# Patient Record
Sex: FEMALE | Race: WHITE | NOT HISPANIC OR LATINO | Employment: OTHER | URBAN - METROPOLITAN AREA
[De-identification: names, ages, dates, MRNs, and addresses within clinical notes are randomized per-mention and may not be internally consistent; named-entity substitution may affect disease eponyms.]

---

## 2017-01-06 ENCOUNTER — HOSPITAL ENCOUNTER (OUTPATIENT)
Dept: NUCLEAR MEDICINE | Facility: CLINIC | Age: 72
Setting detail: NUCLEAR MEDICINE
End: 2017-01-06
Attending: FAMILY MEDICINE
Payer: COMMERCIAL

## 2017-01-06 ENCOUNTER — HOSPITAL ENCOUNTER (OUTPATIENT)
Dept: CARDIOLOGY | Facility: CLINIC | Age: 72
Discharge: HOME | End: 2017-01-06
Attending: FAMILY MEDICINE | Admitting: FAMILY MEDICINE
Payer: COMMERCIAL

## 2017-01-06 DIAGNOSIS — R55 SYNCOPE, UNSPECIFIED SYNCOPE TYPE: Primary | ICD-10-CM

## 2017-01-06 DIAGNOSIS — R06.09 EXERTIONAL DYSPNEA: ICD-10-CM

## 2017-01-06 DIAGNOSIS — R55 SYNCOPE, UNSPECIFIED SYNCOPE TYPE: ICD-10-CM

## 2017-01-06 PROCEDURE — 25000125 ZZHC RX 250

## 2017-01-06 PROCEDURE — 93017 CV STRESS TEST TRACING ONLY: CPT

## 2017-01-06 PROCEDURE — 78452 HT MUSCLE IMAGE SPECT MULT: CPT | Mod: 26 | Performed by: INTERNAL MEDICINE

## 2017-01-06 PROCEDURE — 34300033 ZZH RX 343: Performed by: FAMILY MEDICINE

## 2017-01-06 PROCEDURE — 78452 HT MUSCLE IMAGE SPECT MULT: CPT

## 2017-01-06 PROCEDURE — 93016 CV STRESS TEST SUPVJ ONLY: CPT | Performed by: INTERNAL MEDICINE

## 2017-01-06 PROCEDURE — A9502 TC99M TETROFOSMIN: HCPCS | Performed by: FAMILY MEDICINE

## 2017-01-06 PROCEDURE — 93018 CV STRESS TEST I&R ONLY: CPT | Performed by: INTERNAL MEDICINE

## 2017-01-06 RX ORDER — REGADENOSON 0.08 MG/ML
INJECTION, SOLUTION INTRAVENOUS
Status: COMPLETED
Start: 2017-01-06 | End: 2017-01-06

## 2017-01-06 RX ADMIN — TETROFOSMIN 11 MCI.: 0.23 INJECTION, POWDER, LYOPHILIZED, FOR SOLUTION INTRAVENOUS at 08:11

## 2017-01-06 RX ADMIN — REGADENOSON 0.8 MG: 0.08 INJECTION, SOLUTION INTRAVENOUS at 09:36

## 2017-01-06 RX ADMIN — TETROFOSMIN 30.6 MCI.: 0.23 INJECTION, POWDER, LYOPHILIZED, FOR SOLUTION INTRAVENOUS at 09:21

## 2017-01-06 NOTE — PLAN OF CARE
Pt here for lexiscan, lungs are clear bilaterally, procedure explained and performed without difficulty, VSS

## 2020-01-11 ENCOUNTER — APPOINTMENT (OUTPATIENT)
Dept: CT IMAGING | Facility: CLINIC | Age: 75
DRG: 190 | End: 2020-01-11
Attending: INTERNAL MEDICINE
Payer: COMMERCIAL

## 2020-01-11 ENCOUNTER — TRANSFERRED RECORDS (OUTPATIENT)
Dept: HEALTH INFORMATION MANAGEMENT | Facility: CLINIC | Age: 75
End: 2020-01-11

## 2020-01-11 ENCOUNTER — HOSPITAL ENCOUNTER (INPATIENT)
Facility: CLINIC | Age: 75
LOS: 4 days | Discharge: HOME HEALTH | DRG: 190 | End: 2020-01-15
Attending: EMERGENCY MEDICINE | Admitting: INTERNAL MEDICINE
Payer: COMMERCIAL

## 2020-01-11 ENCOUNTER — APPOINTMENT (OUTPATIENT)
Dept: GENERAL RADIOLOGY | Facility: CLINIC | Age: 75
DRG: 190 | End: 2020-01-11
Attending: EMERGENCY MEDICINE
Payer: COMMERCIAL

## 2020-01-11 DIAGNOSIS — R05.9 COUGH: ICD-10-CM

## 2020-01-11 DIAGNOSIS — R91.8 PULMONARY NODULES: ICD-10-CM

## 2020-01-11 DIAGNOSIS — J96.01 ACUTE RESPIRATORY FAILURE WITH HYPOXIA (H): ICD-10-CM

## 2020-01-11 DIAGNOSIS — R06.2 WHEEZING: ICD-10-CM

## 2020-01-11 DIAGNOSIS — J44.1 COPD EXACERBATION (H): Primary | ICD-10-CM

## 2020-01-11 DIAGNOSIS — Z72.0 TOBACCO ABUSE DISORDER: ICD-10-CM

## 2020-01-11 DIAGNOSIS — J44.1 COPD WITH ACUTE EXACERBATION (H): ICD-10-CM

## 2020-01-11 DIAGNOSIS — I10 ESSENTIAL HYPERTENSION: ICD-10-CM

## 2020-01-11 DIAGNOSIS — R06.02 SHORTNESS OF BREATH: ICD-10-CM

## 2020-01-11 LAB
ALBUMIN SERPL-MCNC: 3.1 G/DL (ref 3.4–5)
ALP SERPL-CCNC: 87 U/L (ref 40–150)
ALT SERPL W P-5'-P-CCNC: 27 U/L (ref 0–50)
ANION GAP SERPL CALCULATED.3IONS-SCNC: 4 MMOL/L (ref 3–14)
AST SERPL W P-5'-P-CCNC: 31 U/L (ref 0–45)
BASOPHILS # BLD AUTO: 0 10E9/L (ref 0–0.2)
BASOPHILS NFR BLD AUTO: 0.5 %
BILIRUB SERPL-MCNC: 0.4 MG/DL (ref 0.2–1.3)
BUN SERPL-MCNC: 14 MG/DL (ref 7–30)
CALCIUM SERPL-MCNC: 8.7 MG/DL (ref 8.5–10.1)
CHLORIDE SERPL-SCNC: 98 MMOL/L (ref 94–109)
CO2 BLDCOV-SCNC: 26 MMOL/L (ref 21–28)
CO2 BLDCOV-SCNC: 27 MMOL/L (ref 21–28)
CO2 SERPL-SCNC: 29 MMOL/L (ref 20–32)
CREAT SERPL-MCNC: 0.8 MG/DL (ref 0.52–1.04)
DIFFERENTIAL METHOD BLD: ABNORMAL
EOSINOPHIL # BLD AUTO: 0 10E9/L (ref 0–0.7)
EOSINOPHIL NFR BLD AUTO: 0.1 %
ERYTHROCYTE [DISTWIDTH] IN BLOOD BY AUTOMATED COUNT: 16.1 % (ref 10–15)
FLUAV+FLUBV AG SPEC QL: NEGATIVE
FLUAV+FLUBV AG SPEC QL: NEGATIVE
GFR SERPL CREATININE-BSD FRML MDRD: 72 ML/MIN/{1.73_M2}
GLUCOSE SERPL-MCNC: 115 MG/DL (ref 70–99)
HCT VFR BLD AUTO: 40.7 % (ref 35–47)
HGB BLD-MCNC: 12.9 G/DL (ref 11.7–15.7)
IMM GRANULOCYTES # BLD: 0 10E9/L (ref 0–0.4)
IMM GRANULOCYTES NFR BLD: 0.4 %
INTERPRETATION ECG - MUSE: NORMAL
LACTATE BLD-SCNC: 1 MMOL/L (ref 0.7–2.1)
LACTATE BLD-SCNC: 2.1 MMOL/L (ref 0.7–2.1)
LYMPHOCYTES # BLD AUTO: 0.4 10E9/L (ref 0.8–5.3)
LYMPHOCYTES NFR BLD AUTO: 5.8 %
MAGNESIUM SERPL-MCNC: 2.2 MG/DL (ref 1.6–2.3)
MAGNESIUM SERPL-MCNC: 2.2 MG/DL (ref 1.6–2.3)
MCH RBC QN AUTO: 31.8 PG (ref 26.5–33)
MCHC RBC AUTO-ENTMCNC: 31.7 G/DL (ref 31.5–36.5)
MCV RBC AUTO: 100 FL (ref 78–100)
MONOCYTES # BLD AUTO: 0.6 10E9/L (ref 0–1.3)
MONOCYTES NFR BLD AUTO: 8.3 %
NEUTROPHILS # BLD AUTO: 6.4 10E9/L (ref 1.6–8.3)
NEUTROPHILS NFR BLD AUTO: 84.9 %
NRBC # BLD AUTO: 0 10*3/UL
NRBC BLD AUTO-RTO: 0 /100
PCO2 BLDV: 61 MM HG (ref 40–50)
PCO2 BLDV: 64 MM HG (ref 40–50)
PH BLDV: 7.22 PH (ref 7.32–7.43)
PH BLDV: 7.25 PH (ref 7.32–7.43)
PHOSPHATE SERPL-MCNC: 2.2 MG/DL (ref 2.5–4.5)
PHOSPHATE SERPL-MCNC: 2.5 MG/DL (ref 2.5–4.5)
PLATELET # BLD AUTO: 220 10E9/L (ref 150–450)
PO2 BLDV: 17 MM HG (ref 25–47)
PO2 BLDV: 23 MM HG (ref 25–47)
POTASSIUM SERPL-SCNC: 4.3 MMOL/L (ref 3.4–5.3)
PROT SERPL-MCNC: 8 G/DL (ref 6.8–8.8)
RBC # BLD AUTO: 4.06 10E12/L (ref 3.8–5.2)
RSV AG SPEC QL: POSITIVE
SAO2 % BLDV FROM PO2: 18 %
SAO2 % BLDV FROM PO2: 28 %
SODIUM SERPL-SCNC: 131 MMOL/L (ref 133–144)
SPECIMEN SOURCE: ABNORMAL
SPECIMEN SOURCE: NORMAL
TROPONIN I SERPL-MCNC: <0.015 UG/L (ref 0–0.04)
WBC # BLD AUTO: 7.6 10E9/L (ref 4–11)

## 2020-01-11 PROCEDURE — 83605 ASSAY OF LACTIC ACID: CPT

## 2020-01-11 PROCEDURE — 36415 COLL VENOUS BLD VENIPUNCTURE: CPT | Performed by: EMERGENCY MEDICINE

## 2020-01-11 PROCEDURE — 25000125 ZZHC RX 250: Performed by: EMERGENCY MEDICINE

## 2020-01-11 PROCEDURE — 80053 COMPREHEN METABOLIC PANEL: CPT | Performed by: EMERGENCY MEDICINE

## 2020-01-11 PROCEDURE — 83735 ASSAY OF MAGNESIUM: CPT | Performed by: EMERGENCY MEDICINE

## 2020-01-11 PROCEDURE — 94640 AIRWAY INHALATION TREATMENT: CPT | Mod: 76

## 2020-01-11 PROCEDURE — 40000274 ZZH STATISTIC RCP CONSULT EA 30 MIN

## 2020-01-11 PROCEDURE — 87633 RESP VIRUS 12-25 TARGETS: CPT | Performed by: EMERGENCY MEDICINE

## 2020-01-11 PROCEDURE — 96374 THER/PROPH/DIAG INJ IV PUSH: CPT

## 2020-01-11 PROCEDURE — 25000128 H RX IP 250 OP 636: Performed by: EMERGENCY MEDICINE

## 2020-01-11 PROCEDURE — 87807 RSV ASSAY W/OPTIC: CPT | Performed by: EMERGENCY MEDICINE

## 2020-01-11 PROCEDURE — 25800030 ZZH RX IP 258 OP 636: Performed by: EMERGENCY MEDICINE

## 2020-01-11 PROCEDURE — 99207 ZZC CDG-MDM COMPONENT: MEETS MODERATE - UP CODED: CPT | Performed by: INTERNAL MEDICINE

## 2020-01-11 PROCEDURE — 12000000 ZZH R&B MED SURG/OB

## 2020-01-11 PROCEDURE — 94640 AIRWAY INHALATION TREATMENT: CPT

## 2020-01-11 PROCEDURE — 84100 ASSAY OF PHOSPHORUS: CPT | Performed by: INTERNAL MEDICINE

## 2020-01-11 PROCEDURE — 87804 INFLUENZA ASSAY W/OPTIC: CPT | Performed by: EMERGENCY MEDICINE

## 2020-01-11 PROCEDURE — 87581 M.PNEUMON DNA AMP PROBE: CPT | Performed by: EMERGENCY MEDICINE

## 2020-01-11 PROCEDURE — 71250 CT THORAX DX C-: CPT

## 2020-01-11 PROCEDURE — 36415 COLL VENOUS BLD VENIPUNCTURE: CPT | Performed by: INTERNAL MEDICINE

## 2020-01-11 PROCEDURE — 87486 CHLMYD PNEUM DNA AMP PROBE: CPT | Performed by: EMERGENCY MEDICINE

## 2020-01-11 PROCEDURE — 83735 ASSAY OF MAGNESIUM: CPT | Performed by: INTERNAL MEDICINE

## 2020-01-11 PROCEDURE — 85025 COMPLETE CBC W/AUTO DIFF WBC: CPT | Performed by: EMERGENCY MEDICINE

## 2020-01-11 PROCEDURE — 84100 ASSAY OF PHOSPHORUS: CPT | Performed by: EMERGENCY MEDICINE

## 2020-01-11 PROCEDURE — 40000275 ZZH STATISTIC RCP TIME EA 10 MIN

## 2020-01-11 PROCEDURE — 25000128 H RX IP 250 OP 636: Performed by: INTERNAL MEDICINE

## 2020-01-11 PROCEDURE — 25000125 ZZHC RX 250: Performed by: INTERNAL MEDICINE

## 2020-01-11 PROCEDURE — 82803 BLOOD GASES ANY COMBINATION: CPT

## 2020-01-11 PROCEDURE — 84484 ASSAY OF TROPONIN QUANT: CPT | Performed by: EMERGENCY MEDICINE

## 2020-01-11 PROCEDURE — 93005 ELECTROCARDIOGRAM TRACING: CPT

## 2020-01-11 PROCEDURE — 96361 HYDRATE IV INFUSION ADD-ON: CPT

## 2020-01-11 PROCEDURE — 71046 X-RAY EXAM CHEST 2 VIEWS: CPT

## 2020-01-11 PROCEDURE — 25800030 ZZH RX IP 258 OP 636: Performed by: INTERNAL MEDICINE

## 2020-01-11 PROCEDURE — 87040 BLOOD CULTURE FOR BACTERIA: CPT | Performed by: EMERGENCY MEDICINE

## 2020-01-11 PROCEDURE — 99223 1ST HOSP IP/OBS HIGH 75: CPT | Performed by: INTERNAL MEDICINE

## 2020-01-11 PROCEDURE — 99285 EMERGENCY DEPT VISIT HI MDM: CPT | Mod: 25

## 2020-01-11 RX ORDER — SODIUM CHLORIDE 9 MG/ML
INJECTION, SOLUTION INTRAVENOUS CONTINUOUS
Status: ACTIVE | OUTPATIENT
Start: 2020-01-11 | End: 2020-01-12

## 2020-01-11 RX ORDER — ALBUTEROL SULFATE 90 UG/1
2 AEROSOL, METERED RESPIRATORY (INHALATION) EVERY 4 HOURS PRN
COMMUNITY
Start: 2018-02-27

## 2020-01-11 RX ORDER — AMOXICILLIN 250 MG
2 CAPSULE ORAL 2 TIMES DAILY
Status: DISCONTINUED | OUTPATIENT
Start: 2020-01-11 | End: 2020-01-15 | Stop reason: HOSPADM

## 2020-01-11 RX ORDER — POTASSIUM CHLORIDE 29.8 MG/ML
20 INJECTION INTRAVENOUS
Status: DISCONTINUED | OUTPATIENT
Start: 2020-01-11 | End: 2020-01-15 | Stop reason: HOSPADM

## 2020-01-11 RX ORDER — PROCHLORPERAZINE MALEATE 5 MG
5 TABLET ORAL EVERY 6 HOURS PRN
Status: DISCONTINUED | OUTPATIENT
Start: 2020-01-11 | End: 2020-01-15 | Stop reason: HOSPADM

## 2020-01-11 RX ORDER — LEFLUNOMIDE 10 MG/1
10 TABLET ORAL DAILY
Status: DISCONTINUED | OUTPATIENT
Start: 2020-01-12 | End: 2020-01-15 | Stop reason: HOSPADM

## 2020-01-11 RX ORDER — IPRATROPIUM BROMIDE AND ALBUTEROL SULFATE 2.5; .5 MG/3ML; MG/3ML
3 SOLUTION RESPIRATORY (INHALATION)
Status: DISCONTINUED | OUTPATIENT
Start: 2020-01-11 | End: 2020-01-15 | Stop reason: HOSPADM

## 2020-01-11 RX ORDER — ONDANSETRON 4 MG/1
4 TABLET, ORALLY DISINTEGRATING ORAL EVERY 6 HOURS PRN
Status: DISCONTINUED | OUTPATIENT
Start: 2020-01-11 | End: 2020-01-15 | Stop reason: HOSPADM

## 2020-01-11 RX ORDER — AMOXICILLIN 250 MG
1 CAPSULE ORAL 2 TIMES DAILY
Status: DISCONTINUED | OUTPATIENT
Start: 2020-01-11 | End: 2020-01-15 | Stop reason: HOSPADM

## 2020-01-11 RX ORDER — ONDANSETRON 2 MG/ML
4 INJECTION INTRAMUSCULAR; INTRAVENOUS EVERY 6 HOURS PRN
Status: DISCONTINUED | OUTPATIENT
Start: 2020-01-11 | End: 2020-01-15 | Stop reason: HOSPADM

## 2020-01-11 RX ORDER — IPRATROPIUM BROMIDE AND ALBUTEROL SULFATE 2.5; .5 MG/3ML; MG/3ML
3 SOLUTION RESPIRATORY (INHALATION)
Status: DISCONTINUED | OUTPATIENT
Start: 2020-01-11 | End: 2020-01-11

## 2020-01-11 RX ORDER — PREDNISONE 20 MG/1
60 TABLET ORAL DAILY
Status: DISCONTINUED | OUTPATIENT
Start: 2020-01-12 | End: 2020-01-15 | Stop reason: HOSPADM

## 2020-01-11 RX ORDER — ATENOLOL 25 MG/1
25 TABLET ORAL DAILY
COMMUNITY

## 2020-01-11 RX ORDER — MAGNESIUM SULFATE HEPTAHYDRATE 40 MG/ML
4 INJECTION, SOLUTION INTRAVENOUS EVERY 4 HOURS PRN
Status: DISCONTINUED | OUTPATIENT
Start: 2020-01-11 | End: 2020-01-15 | Stop reason: HOSPADM

## 2020-01-11 RX ORDER — POTASSIUM CHLORIDE 1500 MG/1
20-40 TABLET, EXTENDED RELEASE ORAL
Status: DISCONTINUED | OUTPATIENT
Start: 2020-01-11 | End: 2020-01-15 | Stop reason: HOSPADM

## 2020-01-11 RX ORDER — LEFLUNOMIDE 10 MG/1
10 TABLET ORAL DAILY
COMMUNITY

## 2020-01-11 RX ORDER — BISACODYL 5 MG
10 TABLET, DELAYED RELEASE (ENTERIC COATED) ORAL DAILY PRN
Status: DISCONTINUED | OUTPATIENT
Start: 2020-01-11 | End: 2020-01-15 | Stop reason: HOSPADM

## 2020-01-11 RX ORDER — ATENOLOL 25 MG/1
25 TABLET ORAL DAILY
Status: DISCONTINUED | OUTPATIENT
Start: 2020-01-12 | End: 2020-01-15 | Stop reason: HOSPADM

## 2020-01-11 RX ORDER — PROCHLORPERAZINE 25 MG
12.5 SUPPOSITORY, RECTAL RECTAL EVERY 12 HOURS PRN
Status: DISCONTINUED | OUTPATIENT
Start: 2020-01-11 | End: 2020-01-15 | Stop reason: HOSPADM

## 2020-01-11 RX ORDER — POTASSIUM CHLORIDE 7.45 MG/ML
10 INJECTION INTRAVENOUS
Status: DISCONTINUED | OUTPATIENT
Start: 2020-01-11 | End: 2020-01-15 | Stop reason: HOSPADM

## 2020-01-11 RX ORDER — LISINOPRIL 20 MG/1
20 TABLET ORAL DAILY
COMMUNITY

## 2020-01-11 RX ORDER — VIT C/E/ZN/COPPR/LUTEIN/ZEAXAN 60 MG-6 MG
1 CAPSULE ORAL DAILY
COMMUNITY

## 2020-01-11 RX ORDER — ALBUTEROL SULFATE 0.83 MG/ML
2.5 SOLUTION RESPIRATORY (INHALATION) EVERY 6 HOURS PRN
Status: DISCONTINUED | OUTPATIENT
Start: 2020-01-11 | End: 2020-01-15 | Stop reason: HOSPADM

## 2020-01-11 RX ORDER — ACETAMINOPHEN 325 MG/1
650 TABLET ORAL EVERY 4 HOURS PRN
Status: DISCONTINUED | OUTPATIENT
Start: 2020-01-11 | End: 2020-01-15 | Stop reason: HOSPADM

## 2020-01-11 RX ORDER — POLYETHYLENE GLYCOL 3350 17 G/17G
17 POWDER, FOR SOLUTION ORAL DAILY PRN
Status: DISCONTINUED | OUTPATIENT
Start: 2020-01-11 | End: 2020-01-15 | Stop reason: HOSPADM

## 2020-01-11 RX ORDER — METHYLPREDNISOLONE SODIUM SUCCINATE 125 MG/2ML
125 INJECTION, POWDER, LYOPHILIZED, FOR SOLUTION INTRAMUSCULAR; INTRAVENOUS ONCE
Status: COMPLETED | OUTPATIENT
Start: 2020-01-11 | End: 2020-01-11

## 2020-01-11 RX ORDER — ASPIRIN 81 MG/1
81 TABLET ORAL DAILY
COMMUNITY

## 2020-01-11 RX ORDER — POTASSIUM CHLORIDE 1.5 G/1.58G
20-40 POWDER, FOR SOLUTION ORAL
Status: DISCONTINUED | OUTPATIENT
Start: 2020-01-11 | End: 2020-01-15 | Stop reason: HOSPADM

## 2020-01-11 RX ORDER — ASPIRIN 81 MG/1
81 TABLET ORAL DAILY
Status: DISCONTINUED | OUTPATIENT
Start: 2020-01-12 | End: 2020-01-15 | Stop reason: HOSPADM

## 2020-01-11 RX ORDER — IPRATROPIUM BROMIDE AND ALBUTEROL SULFATE 2.5; .5 MG/3ML; MG/3ML
3 SOLUTION RESPIRATORY (INHALATION) EVERY 4 HOURS
Status: DISCONTINUED | OUTPATIENT
Start: 2020-01-11 | End: 2020-01-11

## 2020-01-11 RX ORDER — POTASSIUM CL/LIDO/0.9 % NACL 10MEQ/0.1L
10 INTRAVENOUS SOLUTION, PIGGYBACK (ML) INTRAVENOUS
Status: DISCONTINUED | OUTPATIENT
Start: 2020-01-11 | End: 2020-01-15 | Stop reason: HOSPADM

## 2020-01-11 RX ORDER — LISINOPRIL 5 MG/1
5 TABLET ORAL DAILY
Status: DISCONTINUED | OUTPATIENT
Start: 2020-01-12 | End: 2020-01-15 | Stop reason: HOSPADM

## 2020-01-11 RX ORDER — NAPROXEN SODIUM 220 MG
220 TABLET ORAL 2 TIMES DAILY PRN
COMMUNITY
End: 2023-01-01

## 2020-01-11 RX ORDER — BISACODYL 5 MG
5 TABLET, DELAYED RELEASE (ENTERIC COATED) ORAL DAILY PRN
Status: DISCONTINUED | OUTPATIENT
Start: 2020-01-11 | End: 2020-01-15 | Stop reason: HOSPADM

## 2020-01-11 RX ORDER — BISACODYL 5 MG
15 TABLET, DELAYED RELEASE (ENTERIC COATED) ORAL DAILY PRN
Status: DISCONTINUED | OUTPATIENT
Start: 2020-01-11 | End: 2020-01-15 | Stop reason: HOSPADM

## 2020-01-11 RX ORDER — LIDOCAINE 40 MG/G
CREAM TOPICAL
Status: DISCONTINUED | OUTPATIENT
Start: 2020-01-11 | End: 2020-01-15 | Stop reason: HOSPADM

## 2020-01-11 RX ADMIN — IPRATROPIUM BROMIDE AND ALBUTEROL SULFATE 3 ML: .5; 3 SOLUTION RESPIRATORY (INHALATION) at 12:39

## 2020-01-11 RX ADMIN — IPRATROPIUM BROMIDE AND ALBUTEROL SULFATE 3 ML: .5; 3 SOLUTION RESPIRATORY (INHALATION) at 12:30

## 2020-01-11 RX ADMIN — SODIUM CHLORIDE: 9 INJECTION, SOLUTION INTRAVENOUS at 17:06

## 2020-01-11 RX ADMIN — METHYLPREDNISOLONE SODIUM SUCCINATE 125 MG: 125 INJECTION, POWDER, FOR SOLUTION INTRAMUSCULAR; INTRAVENOUS at 12:39

## 2020-01-11 RX ADMIN — SODIUM CHLORIDE 1000 ML: 9 INJECTION, SOLUTION INTRAVENOUS at 12:39

## 2020-01-11 RX ADMIN — IPRATROPIUM BROMIDE AND ALBUTEROL SULFATE 3 ML: .5; 3 SOLUTION RESPIRATORY (INHALATION) at 21:03

## 2020-01-11 RX ADMIN — ENOXAPARIN SODIUM 40 MG: 40 INJECTION SUBCUTANEOUS at 17:26

## 2020-01-11 SDOH — HEALTH STABILITY: MENTAL HEALTH: HOW OFTEN DO YOU HAVE A DRINK CONTAINING ALCOHOL?: NEVER

## 2020-01-11 ASSESSMENT — ACTIVITIES OF DAILY LIVING (ADL)
ADLS_ACUITY_SCORE: 14
ADLS_ACUITY_SCORE: 12

## 2020-01-11 ASSESSMENT — ENCOUNTER SYMPTOMS
SHORTNESS OF BREATH: 1
COUGH: 1

## 2020-01-11 ASSESSMENT — MIFFLIN-ST. JEOR: SCORE: 1262.01

## 2020-01-11 NOTE — LETTER
Transition Communication Hand-off for Care Transitions to Next Level of Care Provider    Name: Avery Arteaga  : 1945  MRN #: [id number]  Primary Care Provider: Althea Rai  Primary Care MD Name: Dr. Althea Rai  Primary Clinic: [facility] [address]  [facility] [address]  Primary Care Clinic Name: [facility]  Reason for Hospitalization:  Cough [R05]  Shortness of breath [R06.02]  Wheezing [R06.2]  COPD exacerbation (H) [J44.1]  Acute respiratory failure with hypoxia (H) [J96.01]  Admit Date/Time: 2020 12:13 PM  Discharge Date: 1/15/2020  Payor Source: Payor: [facility] / Plan: [employer] MEDICARE NON [employer] / Product Type: HMO /     Readmission Assessment Measure (DAVID) Risk Score/category: Average           Reason for Communication Hand-off Referral: Admission diagnoses: COPD    Discharge Plan: Home with home care       Concern for non-adherence with plan of care:   Yes, smoking cessation  Discharge Needs Assessment:  Needs      Most Recent Value   Anticipated Changes Related to Illness  none   Equipment Currently Used at Home  none   Interventions provided  COPD Action Plan   Home Care  [facility] [phone], Fax: [phone]          Already enrolled in Tele-monitoring program and name of program:  NA  Follow-up specialty is recommended: Yes    Follow-up plan:  No future appointments.    Any outstanding tests or procedures:    Procedures     Future Labs/Procedures    Oxygen Adult     Comments:    New Home Oxygen Order 1 liter(s) by nasal cannula with activity with use of portable tank. Expected treatment length is 1 months.. Test on conserving device as applicable.    Patients who qualify for home O2 coverage under the CMS guidelines require ABG tests or O2 sat readings obtained closest to, but no earlier than 2 days prior to the discharge, as evidence of the need for home oxygen therapy. Testing must be performed while patient is in the chronic  stable state. See notes for O2 sats.    I certify that this patient, Avery Arteaga has been under my care and that I, or a nurse practitioner or physician's assistant working with me, had a face-to-face encounter that meets the face-to-face encounter requirements with this patient on 1/15/2020. The patient, Avery Arteaga was evaluated or treated in whole, or in part, for the following medical condition, which necessitates the use of the ordered oxygen. Treatment Diagnosis: COPD and acute viral bronchitis/pneumonia    Attending Provider: Gina Mckeon DO  Physician signature: See electronic signature associated with these discharge orders  Date of Order: January 15, 2020          Referrals     Future Labs/Procedures    Home care nursing referral     Comments:    RN skilled nursing visit. RN to assess respiratory and cardiac status.  RN to teach n/a.    Your provider has ordered home care nursing services. If you have not been contacted within 2 days of your discharge please call the inpatient department phone number at [phone] .    PULMONARY MEDICINE REFERRAL     Comments:    Your provider has referred you to: [facility]: Lung Nodule Clinic [facility] [phone]   [email or web address]    Please be aware that coverage of these services is subject to the terms and limitations of your health insurance plan.  Call member services at your health plan with any benefit or coverage questions.      Please bring the following with you to your appointment:    (1) Any X-Rays, CTs or MRIs which have been performed.  Contact the facility where they were done to arrange for  prior to your scheduled appointment.    (2) List of current medications   (3) This referral request   (4) Any documents/labs given to you for this referral          Supplies     Future Labs/Procedures    NEBULIZER     NEBULIZER     Comments:    Nebulizer with supply kit  Length of need anticipated to  be lifelong          [place] Recommendations:  Patient admitted 1/11 with COPD exacerbation & RSV URI. Patient has a history of COPD, Rheumatoid Arthritis on immunosuppressive therapy, HTN, CAD and 30+year smoker. Met with patient at bedside for COPD education and discharge planning. Patient living at [facility]. She does not receive any services.  States she can request additional services prn for a fee. Patient reports having a supportive son, daughter, and facility.  She is independent in medication management and ADL's. Patient ambulates w/o walker or cane.  Patient manages her COPD with daily Trelegy inhaler. Patient rarely uses her rescue inhaler; states she used it once prior to admission. Patient currently smokes 3-4 cigarettes/day. I encouraged smoking cessation. States smoking cessation has been difficult d/t life changes: death of her parents, divorce. She is followed by her PMD, Dr. Althea Rai with [facility]. Patient reports not having a Pulmonologist. States her COPD has been manageable until recently.  Reviewed COPD action plan, information given to patient. Encouraged post hospitalization follow up with PCP and connecting with a pulmonologist. Encouraged having family member accompany her to f/u visit.     Recommendations are for patient to cease smoking especially now that has been discharged on O2 and to follow up with Pulmonary for pulmonary nodules. Patient was discharged home with a nebulizer and O2.   KAYLAH Cardona MA/RN Case Manager  Inpatient Care Coordination  [facility]   [phone]    AVS/Discharge Summary is the source of truth; this is a helpful guide for improved communication of patient story

## 2020-01-11 NOTE — PROGRESS NOTES
"[facility] RCAT     Date: 1-  Admission Dx: COPD exac.  Pulmonary History; COPD  Home Nebulizer/MDI Use: Alb MDI, Trelegy MDI  Home Oxygen: No  Acuity Level (RCAT flow sheet): 3  Aerosol Therapy initiated: Duoneb Q4      Pulmonary Hygiene initiated: Cough and Deep Breathe      Volume Expansion initiated: I S, C&DB      Current Oxygen Requirements: 2 lpm nc  Current SpO2: 93%    Re-evaluation date: 1-    Patient Education: C& DB      See \"RT Assessments\" flow sheet for patient assessment scoring and Acuity Level Details.             "

## 2020-01-11 NOTE — PHARMACY-ADMISSION MEDICATION HISTORY
Admission medication history interview status for this patient is complete. See [facility] admission navigator for allergy information, prior to admission medications and immunization status.     Medication history interview source(s):Patient  Medication history resources (including written lists, pill bottles, clinic record): SureScripts, Care Everywhere, and home medication list provided by patient  Primary pharmacy: [facility] [address], [place], [place]      Changes made to PTA medication list:  Added: albuterol inhaler, trelegy inhaler, methotrexate, aspirin, naproxen, ocuvite, leflunomide, fa-B6-B12 vitamin, vitamin D3, atenolol, lisinopril  Deleted: none  Changed: none    Actions taken by pharmacist (provider contacted, etc):None     Additional medication history information:None    Medication reconciliation/reorder completed by provider prior to medication history?  No     Do you take OTC medications (eg tylenol, ibuprofen, fish oil, eye/ear drops, etc)? Yes - see list       Prior to Admission medications    Medication Sig Last Dose Taking? Auth Provider   albuterol (PROAIR HFA/PROVENTIL HFA/VENTOLIN HFA) 108 (90 Base) MCG/ACT inhaler Inhale 2 puffs into the lungs every 4 hours as needed Past Week at Unknown time Yes Unknown, Entered By History   aspirin 81 MG EC tablet Take 81 mg by mouth daily 1/11/2020 at am Yes Unknown, Entered By History   atenolol (TENORMIN) 25 MG tablet Take 25 mg by mouth daily 1/11/2020 at am Yes Unknown, Entered By History   Cholecalciferol (VITAMIN D3) 25 MCG (1000 UT) CAPS Take 2,000 Units by mouth daily 1/10/2020 at pm Yes Unknown, Entered By History   fa-pyridoxine-cyancobalamin 2.5-25-2 MG TABS per tablet Take 1 tablet by mouth daily 1/11/2020 at am Yes Unknown, Entered By History   Fluticasone-Umeclidin-Vilanterol (TRELEGY ELLIPTA) 100-62.5-25 MCG/INH oral inhaler Inhale 1 puff into the lungs daily 1/11/2020 at am Yes Unknown, Entered By  History   leflunomide (ARAVA) 10 MG tablet Take 10 mg by mouth daily 1/11/2020 at am Yes Unknown, Entered By History   lisinopril (PRINIVIL/ZESTRIL) 5 MG tablet Take 5 mg by mouth daily 1/11/2020 at am Yes Unknown, Entered By History   methotrexate 2.5 MG tablet Take 22.5 mg by mouth every 7 days On Mondays 1/6/2020 at am Yes Unknown, Entered By History   multivitamin  with lutein (OCUVITE WITH LTEIN) CAPS per capsule Take 1 capsule by mouth daily 1/11/2020 at am Yes Unknown, Entered By History   naproxen sodium (ANAPROX) 220 MG tablet Take 220 mg by mouth 2 times daily as needed for moderate pain or headaches More than a month at Unknown time  Unknown, Entered By History

## 2020-01-11 NOTE — ED NOTES
[facility]  ED Nurse Handoff Report    Avery Arteaga is a 74 year old female   ED Chief complaint: Shortness of Breath  . ED Diagnosis:   Final diagnoses:   COPD exacerbation (H)   Shortness of breath   Acute respiratory failure with hypoxia (H)   Wheezing   Cough     Allergies: No Known Allergies    Code Status: Full Code  Activity level - Baseline/Home:  Independent. Activity Level - Current:   Stand by Assist. Lift room needed: No. Bariatric: No   Needed: No   Isolation: No. Infection: Not Applicable.     Vital Signs:   Vitals:    01/11/20 1300 01/11/20 1330 01/11/20 1345 01/11/20 1400   BP: (!) 164/88 (!) 172/95 (!) 147/94 (!) 153/74   Pulse: 88 87 89 86   Resp: 29 27 27 28   Temp:       TempSrc:       SpO2: 100% 98% 100% 100%       Cardiac Rhythm:  ,      Pain level: 0-10 Pain Scale: 7  Patient confused: No. Patient Falls Risk: Yes.   Elimination Status: Has voided   Patient Report - Initial Complaint: SOB. Focused Assessment: SOB, dyspnea, cough, audible wheezing, congestion, generalized weakness  Tests Performed: Lab/imaging. Abnormal Results:   Labs Ordered and Resulted from Time of ED Arrival Up to the Time of Departure from the ED   CBC WITH PLATELETS DIFFERENTIAL - Abnormal; Notable for the following components:       Result Value    RDW 16.1 (*)     Absolute Lymphocytes 0.4 (*)     All other components within normal limits   COMPREHENSIVE METABOLIC PANEL - Abnormal; Notable for the following components:    Sodium 131 (*)     Glucose 115 (*)     Albumin 3.1 (*)     All other components within normal limits   ISTAT  GASES LACTATE MICHAEL POCT - Abnormal; Notable for the following components:    Ph Venous 7.22 (*)     PCO2 Venous 64 (*)     PO2 Venous 23 (*)     All other components within normal limits   ISTAT  GASES LACTATE MICHAEL POCT - Abnormal; Notable for the following components:    Ph Venous 7.25 (*)     PCO2 Venous 61 (*)     PO2 Venous 17 (*)     All other components within  normal limits   TROPONIN I   PULSE OXIMETRY NURSING   CARDIAC CONTINUOUS MONITORING   STRICT INTAKE AND OUTPUT   PERIPHERAL IV CATHETER   ISTAT CG4 GASES LACTATE MICHAEL NURSING POCT   ISTAT CG4 GASES LACTATE MICHAEL NURSING POCT   BLOOD CULTURE   INFLUENZA A/B ANTIGEN   BLOOD CULTURE     .   Treatments provided: MAR  Family Comments: daughter present and supportive  OBS brochure/video discussed/provided to patient:  N/A  ED Medications:   Medications   ipratropium - albuterol 0.5 mg/2.5 mg/3 mL (DUONEB) neb solution 3 mL (3 mLs Nebulization Given 1/11/20 1239)   sodium chloride (PF) 0.9% PF flush 3 mL (has no administration in time range)   sodium chloride (PF) 0.9% PF flush 3 mL (has no administration in time range)   0.9% sodium chloride BOLUS (1,000 mLs Intravenous New Bag 1/11/20 1239)   methylPREDNISolone sodium succinate (solu-MEDROL) injection 125 mg (125 mg Intravenous Given 1/11/20 1239)     Drips infusing:  Yes  For the majority of the shift, the patient's behavior Green. Interventions performed were .     Severe Sepsis OR Septic Shock Diagnosis Present: No      ED Nurse Name/Phone Number: Sharmin Angeles RN,   2:14 PM    RECEIVING UNIT ED HANDOFF REVIEW    Above ED Nurse Handoff Report was reviewed: Yes  Reviewed by: Arielle Campuzano RN on January 11, 2020 at 2:56 PM

## 2020-01-11 NOTE — H&P
[facility]  History and Physical Hospitalist       Date of Admission:  1/11/2020    Assessment & Plan   Avery Arteaga is a 74 year old female with a PMHx COPD who presents to [facility] ED on 1/11/20 with increased shortness of breath.     Acute hypoxic respiratory failure secondary to acute COPD exacerbation. Viral URI infection. Organism RSV  Unknown if she had had formal PFT testing. >30 pk/yr smoking history indicating presumed COPD. Increased risk of opportunistic infection due to immunosuppressive therapy associated with RA  -Prednisone 60 mg daily x 5 days  -Duo-nebulizers q 4 hours; albuterol neb prn  -Home inhalers: albuterol, fluticasone-Umeclidin-Vilanterol   -Hold abx for now. No focal infiltrate on CXR or increased sputum production. Afebrile and no leucocytosis  -Incentive spirometry     Metabolic acidosis   VBG pH 7.25 pCO2 61   -Monitor respiratory status. If change in mentation or continued increase work of breathing repeat blood gas and consider BiPAP therapy. Discussed with patient    Hyponatremia  -Sodium 131. Poor appetite recently.   -IV fluids - recheck sodium in AM    Rheumatoid Arthritis  -Methotrexate 22.5 mg q 7days (Monday)  -Leflunomide 10 mg daily   -Follows OP with Rheumatology     1.0 cm pulmonary nodule  CXR showed a 1.0 cm pulmonary nodule projected over the mid-right lung.  CT PE protocol scan 3/2/18 did not show a lung nodule.Unsure if she had lung CA screening.   -Will need follow-up with chest CT. Family hx of lung CA and active smoker. Awaiting improvement in respiratory status and will order CT scan likely tomorrow. Consider outpatient pulmonary consult for further management due to hx of RA on immunosuppressive medications     Hypertension  BP elevated on admission at 164/95 due to increased work of breathing and stress of acute illness. Continue home medications and monitor response as respiratory status improves.   -Atenolol 25 mg daily  -Lisinopril 5 mg adily      CAD  -ASA 81 mg daily - preventive only. No cardiac hx    Nicotine dependence  Active smoker since 18 yrs/old. Currently smoking 4-5 cig/day  -Encourage smoking cessation  -Declined nicotine patch. 7 mg/day patch prn     FEN: Oral hydration; check mag/phosphorus; regular  Activity: As tolerated  DVT Prophylaxis: Lovenox  Code Status: DNR/DNI  Expected discharge: 2 - 3 days, recommended to prior living arrangement once respiratory status improves and not needing oxygen.    She Singh DO    Primary Care Physician   [facility]    Chief Complaint   SOB  History is obtained from the patient, ER physician, and chart review    History of Present Illness   Avery Arteaga is a 74 year old female with a PMHx COPD who presents to [facility] ED on 1/11/20 with increased shortness of breath.     Seen by PCP 1/7 for annual physical. Had flu shot. Symptoms began shortly after. Increased nasal congestion/rhinorrhea. Followed by sore throat. Increased progressive weakness. Afebrile. Nauseated without vomiting. No chest pain or palpitations. Occasional lightheadedness/dizziness. Never had a flare of COPD requiring hospitalization. No chest pain or palpations. Suppressed appetite. Minimal oral intake, only drinks Pepsi. Overall, just fatigued and tired. Night sweats associated with menopause. No major weight changes. Leg cramps chronic resulting in frequent awakenings. Usually constipated has had a few loose stools over the last few days.     Past Medical History    I have reviewed this patient's medical history and updated it with pertinent information if needed.     Past Medical History:   Diagnosis Date     COPD (chronic obstructive pulmonary disease) (H)      Hypertension      Rheumatoid arthritis (H)      Past Surgical History   I have reviewed this patient's surgical history and updated it with pertinent information if needed.  Past Surgical History:   Procedure Laterality Date      ------------OTHER------------- Right     Right middle finger RA nodule removed     Prior to Admission Medications   Prior to Admission Medications   Prescriptions Last Dose Informant Patient Reported? Taking?   Cholecalciferol (VITAMIN D3) 25 MCG (1000 UT) CAPS 1/10/2020 at pm  Yes Yes   Sig: Take 2,000 Units by mouth daily   Fluticasone-Umeclidin-Vilanterol (TRELEGY ELLIPTA) 100-62.5-25 MCG/INH oral inhaler 1/11/2020 at am  Yes Yes   Sig: Inhale 1 puff into the lungs daily   albuterol (PROAIR HFA/PROVENTIL HFA/VENTOLIN HFA) 108 (90 Base) MCG/ACT inhaler Past Week at Unknown time  Yes Yes   Sig: Inhale 2 puffs into the lungs every 4 hours as needed   aspirin 81 MG EC tablet 1/11/2020 at am  Yes Yes   Sig: Take 81 mg by mouth daily   atenolol (TENORMIN) 25 MG tablet 1/11/2020 at am  Yes Yes   Sig: Take 25 mg by mouth daily   fa-pyridoxine-cyancobalamin 2.5-25-2 MG TABS per tablet 1/11/2020 at am  Yes Yes   Sig: Take 1 tablet by mouth daily   leflunomide (ARAVA) 10 MG tablet 1/11/2020 at am  Yes Yes   Sig: Take 10 mg by mouth daily   lisinopril (PRINIVIL/ZESTRIL) 5 MG tablet 1/11/2020 at am  Yes Yes   Sig: Take 5 mg by mouth daily   methotrexate 2.5 MG tablet 1/6/2020 at am  Yes Yes   Sig: Take 22.5 mg by mouth every 7 days On Mondays   multivitamin  with lutein (OCUVITE WITH LTEIN) CAPS per capsule 1/11/2020 at am  Yes Yes   Sig: Take 1 capsule by mouth daily   naproxen sodium (ANAPROX) 220 MG tablet More than a month at Unknown time  Yes No   Sig: Take 220 mg by mouth 2 times daily as needed for moderate pain or headaches      Facility-Administered Medications: None     Allergies   No Known Allergies    Social History   I have reviewed this patient's social history and updated it with pertinent information if needed. Avery DAO Chandler  reports that she has been smoking cigarettes. She has a 13.75 pack-year smoking history. She has never used smokeless tobacco. She reports previous alcohol use. She reports that she does  not use drugs.    Family History   Mother: Breast CA  Father: Lung CA  Brother: Mesothelioma  Sister: Throat CA    Review of Systems   The 10 point Review of Systems is negative other than noted in the HPI.     Physical Exam   Temp: 98.1  F (36.7  C) Temp src: Oral BP: (!) 140/97 Pulse: 86 Heart Rate: 84 Resp: 28 SpO2: 97 % O2 Device: None (Room air)    Vital Signs with Ranges  Temp:  [98.1  F (36.7  C)] 98.1  F (36.7  C)  Pulse:  [75-94] 86  Heart Rate:  [] 84  Resp:  [22-29] 28  BP: (131-174)/() 153/74  SpO2:  [89 %-100 %] 100 %  0 lbs 0 oz    Constitutional: Awake, alert, cooperative, no apparent distress.  HEENT: AT. NC. Dry mucous membranes, dentures. Conjunctiva and pupils examined and normal. No thyromegaly. No erythema or exudates on posterior oropharynx.   Respiratory: Increased work of breathing with accessory respiratory muscle use. Able to communicate. Wearing NC oxygen. Diminished lung sounds throughout. No wheezing, rales, or rhonchi.   Cardiovascular: Tachycardic and regular rhythm, normal S1 and S2, and no murmur noted.  GI: Soft, non-distended, non-tender, normal bowel sounds.  Lymph/Hematologic: No anterior cervical or supraclavicular adenopathy.  Skin: No rashes, no cyanosis, no edema.  Musculoskeletal: No joint swelling, erythema or tenderness. Dry skin. Bruising on forearms   Neurologic: Cranial nerves 2-12 intact, normal strength and sensation.  Psychiatric: Alert, oriented to person, place and time, no obvious anxiety or depression.    Data   Data reviewed today:  I personally reviewed     Recent Labs   Lab 01/11/20  1242   WBC 7.6   HGB 12.9         *   POTASSIUM 4.3   CHLORIDE 98   CO2 29   BUN 14   CR 0.80   ANIONGAP 4   NARESH 8.7   *   ALBUMIN 3.1*   PROTTOTAL 8.0   BILITOTAL 0.4   ALKPHOS 87   ALT 27   AST 31   TROPI <0.015     Recent Results (from the past 24 hour(s))   XR Chest 2 Views    Narrative    CHEST TWO VIEWS 1/11/2020 1:14 PM     HISTORY:  Cough, SOB, wheezing, concern for flu like symptoms.   Evaluate for pneumonia.    COMPARISON: None.    FINDINGS: There is a 1.0 cm pulmonary nodule projected over the mid  right lung. No evidence of pneumonia, pneumothorax, or pleural  effusion. Heart size normal.       Impression    IMPRESSION: 1.0 cm solitary pulmonary nodule. Consider CT scan of the  chest for further dilation.     SUE MONTOYA MD

## 2020-01-12 ENCOUNTER — APPOINTMENT (OUTPATIENT)
Dept: PHYSICAL THERAPY | Facility: CLINIC | Age: 75
DRG: 190 | End: 2020-01-12
Attending: INTERNAL MEDICINE
Payer: COMMERCIAL

## 2020-01-12 ENCOUNTER — APPOINTMENT (OUTPATIENT)
Dept: OCCUPATIONAL THERAPY | Facility: CLINIC | Age: 75
DRG: 190 | End: 2020-01-12
Attending: INTERNAL MEDICINE
Payer: COMMERCIAL

## 2020-01-12 LAB
ANION GAP SERPL CALCULATED.3IONS-SCNC: 6 MMOL/L (ref 3–14)
BUN SERPL-MCNC: 13 MG/DL (ref 7–30)
C PNEUM DNA SPEC QL NAA+PROBE: NOT DETECTED
CALCIUM SERPL-MCNC: 8.2 MG/DL (ref 8.5–10.1)
CHLORIDE SERPL-SCNC: 111 MMOL/L (ref 94–109)
CO2 SERPL-SCNC: 22 MMOL/L (ref 20–32)
CREAT SERPL-MCNC: 0.75 MG/DL (ref 0.52–1.04)
FLUAV H1 2009 PAND RNA SPEC QL NAA+PROBE: NOT DETECTED
FLUAV H1 RNA SPEC QL NAA+PROBE: NOT DETECTED
FLUAV H3 RNA SPEC QL NAA+PROBE: NOT DETECTED
FLUAV RNA SPEC QL NAA+PROBE: NOT DETECTED
FLUBV RNA SPEC QL NAA+PROBE: NOT DETECTED
GFR SERPL CREATININE-BSD FRML MDRD: 78 ML/MIN/{1.73_M2}
GLUCOSE SERPL-MCNC: 137 MG/DL (ref 70–99)
HADV DNA SPEC QL NAA+PROBE: NOT DETECTED
HCOV PNL SPEC NAA+PROBE: NOT DETECTED
HMPV RNA SPEC QL NAA+PROBE: NOT DETECTED
HPIV1 RNA SPEC QL NAA+PROBE: NOT DETECTED
HPIV2 RNA SPEC QL NAA+PROBE: NOT DETECTED
HPIV3 RNA SPEC QL NAA+PROBE: NOT DETECTED
HPIV4 RNA SPEC QL NAA+PROBE: NOT DETECTED
M PNEUMO DNA SPEC QL NAA+PROBE: NOT DETECTED
MICROBIOLOGIST REVIEW: ABNORMAL
POTASSIUM SERPL-SCNC: 4.3 MMOL/L (ref 3.4–5.3)
RSV RNA SPEC QL NAA+PROBE: ABNORMAL
RSV RNA SPEC QL NAA+PROBE: NOT DETECTED
RV+EV RNA SPEC QL NAA+PROBE: NOT DETECTED
SODIUM SERPL-SCNC: 139 MMOL/L (ref 133–144)

## 2020-01-12 PROCEDURE — 25000132 ZZH RX MED GY IP 250 OP 250 PS 637: Performed by: INTERNAL MEDICINE

## 2020-01-12 PROCEDURE — 25000128 H RX IP 250 OP 636: Performed by: INTERNAL MEDICINE

## 2020-01-12 PROCEDURE — 80048 BASIC METABOLIC PNL TOTAL CA: CPT | Performed by: INTERNAL MEDICINE

## 2020-01-12 PROCEDURE — 97161 PT EVAL LOW COMPLEX 20 MIN: CPT | Mod: GP | Performed by: PHYSICAL THERAPIST

## 2020-01-12 PROCEDURE — 40000275 ZZH STATISTIC RCP TIME EA 10 MIN

## 2020-01-12 PROCEDURE — 25000131 ZZH RX MED GY IP 250 OP 636 PS 637: Performed by: INTERNAL MEDICINE

## 2020-01-12 PROCEDURE — 94640 AIRWAY INHALATION TREATMENT: CPT

## 2020-01-12 PROCEDURE — 97535 SELF CARE MNGMENT TRAINING: CPT | Mod: GO

## 2020-01-12 PROCEDURE — 12000000 ZZH R&B MED SURG/OB

## 2020-01-12 PROCEDURE — 94640 AIRWAY INHALATION TREATMENT: CPT | Mod: 76

## 2020-01-12 PROCEDURE — 97165 OT EVAL LOW COMPLEX 30 MIN: CPT | Mod: GO

## 2020-01-12 PROCEDURE — 36415 COLL VENOUS BLD VENIPUNCTURE: CPT | Performed by: INTERNAL MEDICINE

## 2020-01-12 PROCEDURE — 97116 GAIT TRAINING THERAPY: CPT | Mod: GP | Performed by: PHYSICAL THERAPIST

## 2020-01-12 PROCEDURE — 99233 SBSQ HOSP IP/OBS HIGH 50: CPT | Performed by: INTERNAL MEDICINE

## 2020-01-12 PROCEDURE — 25000125 ZZHC RX 250: Performed by: INTERNAL MEDICINE

## 2020-01-12 PROCEDURE — 97530 THERAPEUTIC ACTIVITIES: CPT | Mod: GP | Performed by: PHYSICAL THERAPIST

## 2020-01-12 RX ADMIN — ENOXAPARIN SODIUM 40 MG: 40 INJECTION SUBCUTANEOUS at 17:00

## 2020-01-12 RX ADMIN — Medication 1 LOZENGE: at 08:56

## 2020-01-12 RX ADMIN — IPRATROPIUM BROMIDE AND ALBUTEROL SULFATE 3 ML: .5; 3 SOLUTION RESPIRATORY (INHALATION) at 20:36

## 2020-01-12 RX ADMIN — IPRATROPIUM BROMIDE AND ALBUTEROL SULFATE 3 ML: .5; 3 SOLUTION RESPIRATORY (INHALATION) at 07:25

## 2020-01-12 RX ADMIN — IPRATROPIUM BROMIDE AND ALBUTEROL SULFATE 3 ML: .5; 3 SOLUTION RESPIRATORY (INHALATION) at 15:46

## 2020-01-12 RX ADMIN — LEFLUNOMIDE 10 MG: 10 TABLET ORAL at 08:37

## 2020-01-12 RX ADMIN — LISINOPRIL 5 MG: 5 TABLET ORAL at 08:37

## 2020-01-12 RX ADMIN — ATENOLOL 25 MG: 25 TABLET ORAL at 08:37

## 2020-01-12 RX ADMIN — ASPIRIN 81 MG: 81 TABLET, COATED ORAL at 08:37

## 2020-01-12 RX ADMIN — ALBUTEROL SULFATE 2.5 MG: 2.5 SOLUTION RESPIRATORY (INHALATION) at 12:23

## 2020-01-12 RX ADMIN — PREDNISONE 60 MG: 20 TABLET ORAL at 08:37

## 2020-01-12 ASSESSMENT — ACTIVITIES OF DAILY LIVING (ADL)
ADLS_ACUITY_SCORE: 14
PREVIOUS_RESPONSIBILITIES: MEAL PREP;HOUSEKEEPING;LAUNDRY;SHOPPING;MEDICATION MANAGEMENT;FINANCES;DRIVING
ADLS_ACUITY_SCORE: 14

## 2020-01-12 ASSESSMENT — MIFFLIN-ST. JEOR: SCORE: 1253.14

## 2020-01-12 NOTE — PLAN OF CARE
Pt admitted to floor for COPD ex/RSV  A/Ox4  No pain or nausea reported  O2 2L  Chest CT performed during shift  Up SBA  Regular diet, poor appetite  Discharge pending  Will continue to monitor

## 2020-01-12 NOTE — PROGRESS NOTES
01/12/20 1400   Quick Adds   Type of Visit Initial Occupational Therapy Evaluation   Living Environment   Lives With alone  ([facility] [place])   Living Arrangements apartment;independent living facility   Home Accessibility no concerns   Transportation Anticipated family or friend will provide   Living Environment Comment [place] in [place], no environmental concerns, walk in shower, RTS   Self-Care   Usual Activity Tolerance moderate   Current Activity Tolerance fair   Regular Exercise No   Equipment Currently Used at Home none   Activity/Exercise/Self-Care Comment States that she will walk down to the front of the building , a couple blocks from there to a bench if she wants to have a cigarette   Functional Level   Ambulation 0-->independent   Transferring 0-->independent   Toileting 0-->independent   Bathing 0-->independent   Dressing 0-->independent   Eating 0-->independent   Communication 0-->understands/communicates without difficulty   Swallowing 0-->swallows foods/liquids without difficulty   Cognition 0 - no cognition issues reported   Fall history within last six months no   Which of the above functional risks had a recent onset or change? transferring;toileting;bathing;dressing   Prior Functional Level Comment Pt reports indep in all ADLS, IADLs and mobility tasks with no AD at baseline.    General Information   Onset of Illness/Injury or Date of Surgery - Date 01/11/20   Referring Physician She Singh, DO   Patient/Family Goals Statement Pt's goal is to d/c home   Additional Occupational Profile Info/Pertinent History of Current Problem Per chart: Pt is a 74 year old female admitted with acute hypoxic respiratory failure second to RSV infection causing acute COPD exacerbation.   Precautions/Limitations fall precautions;oxygen therapy device and L/min   Cognitive Status Examination   Orientation orientation to person, place and time   Visual Perception   Visual Perception Wears  glasses   Sensory Examination   Sensory Comments Pt denies numbness/tingling in BUEs   Pain Assessment   Patient Currently in Pain No   Range of Motion (ROM)   ROM Comment BUEs WFL   Strength   Strength Comments Generalized weakness BUEs   Hand Strength   Hand Strength Comments WFL   Bed Mobility Skill: Sit to Supine   Physical Assist/Nonphysical Assist: Sit/Supine supervision;1 person assist   Bed Mobility Skill: Supine to Sit   Physical Assist/Nonphysical Assist: Supine/Sit supervision;1 person assist   Transfer Skill: Bed to Chair/Chair to Bed   Level of [place]: Bed to Chair stand-by assist   Physical Assist/Nonphysical Assist: Bed to Chair 1 person assist   Transfer Skill: Sit to Stand   Level of [place]: Sit/Stand stand-by assist   Physical Assist/Nonphysical Assist: Sit/Stand 1 person assist   Transfer Skill: Toilet Transfer   Level of [place]: Toilet stand-by assist   Physical Assist/Nonphysical Assist: Toilet 1 person assist   Balance   Balance Comments SBA provided while in stance/mobilizing in room   Upper Body Dressing   Level of [place]: Dress Upper Body stand-by assist   Physical Assist/Nonphysical Assist: Dress Upper Body 1 person assist   Lower Body Dressing   Level of [place]: Dress Lower Body stand-by assist   Physical Assist/Nonphysical Assist: Dress Lower Body 1 person assist   Toileting   Level of [place]: Toilet stand-by assist   Physical Assist/Nonphysical Assist: Toilet 1 person assist   Grooming   Level of [place]: Grooming stand-by assist   Physical Assist/Nonphysical Assist: Grooming 1 person assist   Instrumental Activities of Daily Living (IADL)   Previous Responsibilities meal prep;housekeeping;laundry;shopping;medication management;finances;driving   Activities of Daily Living Analysis   Impairments Contributing to Impaired Activities of Daily Living strength decreased   General Therapy Interventions   Planned Therapy Interventions ADL retraining;IADL  "retraining;strengthening;transfer training   Clinical Impression   Criteria for Skilled Therapeutic Interventions Met yes, treatment indicated   OT Diagnosis Impaired ADLs, IADLs and mobility tasks   Influenced by the following impairments Decreased functional activity tolerance, SOB, O2 needs   Assessment of Occupational Performance 3-5 Performance Deficits   Identified Performance Deficits IADLs, bathing, grooming, dressing   Clinical Decision Making (Complexity) Low complexity   Therapy Frequency 5x/week   Predicted Duration of Therapy Intervention (days/wks) 4 days   Anticipated Discharge Disposition Home with Assist   Risks and Benefits of Treatment have been explained. Yes   Patient, Family & other staff in agreement with plan of care Yes   Clinical Impression Comments Pt would benefit from skilled OT to maximize safety and indep in all ADLs, IADLs and mobility tasks due to current deficits impacting function   [facility] AM-PAC  \"6 Clicks\" Daily Activity Inpatient Short Form   1. Putting on and taking off regular lower body clothing? 3 - A Little   2. Bathing (including washing, rinsing, drying)? 3 - A Little   3. Toileting, which includes using toilet, bedpan or urinal? 3 - A Little   4. Putting on and taking off regular upper body clothing? 3 - A Little   5. Taking care of personal grooming such as brushing teeth? 3 - A Little   6. Eating meals? 4 - None   Daily Activity Raw Score (Score out of 24.Lower scores equate to lower levels of function) 19   Total Evaluation Time   Total Evaluation Time (Minutes) 10     "

## 2020-01-12 NOTE — PROGRESS NOTES
01/12/20 1100   Quick Adds   Type of Visit Initial PT Evaluation   Living Environment   Lives With alone  (ILF)   Living Arrangements apartment  (ILF connected to [facility] and LTC)   Home Accessibility no concerns   Transportation Anticipated family or friend will provide   Living Environment Comment as above   Self-Care   Usual Activity Tolerance moderate   Current Activity Tolerance fair   Regular Exercise No   Equipment Currently Used at Home none   Activity/Exercise/Self-Care Comment States that she will walk down to the front of the building , a couple blocks from there to a bench if she wants to have a cigarette without use of an AD   Functional Level Prior   Ambulation 0-->independent   Transferring 0-->independent   Toileting 0-->independent   Bathing 0-->independent   Communication 0-->understands/communicates without difficulty   Swallowing 0-->swallows foods/liquids without difficulty   Cognition 0 - no cognition issues reported   Fall history within last six months no   Which of the above functional risks had a recent onset or change? ambulation   Prior Functional Level Comment Pt reports independent with giat iwhtout a device and all cares.    General Information   Onset of Illness/Injury or Date of Surgery - Date 01/11/20   Referring Physician She Singh, DO   Patient/Family Goals Statement would like to return to her home at d/c.  She may be open to paying for greater cares if needed pending cost. Would be open to HHPT if needed and if insurance covers.    Pertinent History of Current Problem (include personal factors and/or comorbidities that impact the POC) Avery Arteaga is a 74-year-old female with a history of tobacco abuse, rheumatoid arthritis on methotrexate, hypertension and COPD who is admitted to the hospitalist service with acute hypoxic respiratory failure second to RSV infection causing acute COPD exacerbation.   Precautions/Limitations fall precautions;oxygen therapy device and L/min    General Info Comments Pt on 1.5 L per n/c , no suplemental oxygen at home.    Cognitive Status Examination   Orientation orientation to person, place and time   Level of Consciousness alert   Follows Commands and Answers Questions 100% of the time;able to follow multistep instructions   Personal Safety and Judgment intact   Cognitive Comment no issues noted, not formally assessed.   Pain Assessment   Patient Currently in Pain No   Posture    Posture Comments mild forward flexion of trunk, and forward shoulders.    Range of Motion (ROM)   ROM Comment WFL   Strength   Strength Comments WFL with LE strength, deconditioned, general LE weakness affecting balance with gait without a device at this tiem. decreased tolerance to activity.    Bed Mobility   Bed Mobility Comments IND   Transfer Skills   Transfer Comments supervision to mod independent   Gait   Gait Comments gait with IV pole forward flexed trunk, decreased mid to end stance range B.    Balance   Balance Comments Decreased standing balance dynamically without UE support.    Coordination   Coordination no deficits were identified   Muscle Tone   Muscle Tone no deficits were identified   General Therapy Interventions   Planned Therapy Interventions balance training;gait training;strengthening;stretching;transfer training;home program guidelines;progressive activity/exercise   Clinical Impression   Criteria for Skilled Therapeutic Intervention yes, treatment indicated   PT Diagnosis decline in functional actiity tolerance and dynamic balance   Influenced by the following impairments generalized weakness, forward flexed posture, respiratory compromise   Functional limitations due to impairments limited gait distance, assist needed with gait and recommend AD /use of IV pole with giat.    Clinical Presentation Evolving/Changing   Clinical Presentation Rationale function changes pending oxygen levels   Clinical Decision Making (Complexity) Low complexity   Therapy  "Frequency Daily   Predicted Duration of Therapy Intervention (days/wks) 5 days   Anticipated Equipment Needs at Discharge   (continue to assess for AD with gait. )   Anticipated Discharge Disposition Home;Home with Home Therapy   Risk & Benefits of therapy have been explained Yes   Patient, Family & other staff in agreement with plan of care   (pending cost or insurance coverage pt open to HHPT )   Clinical Impression Comments ed pt in benifits of 4WW with giat to allow improved activity tolerance and strength   [facility] AM-PAC  \"6 Clicks\" V.2 Basic Mobility Inpatient Short Form   1. Turning from your back to your side while in a flat bed without using bedrails? 4 - None   2. Moving from lying on your back to sitting on the side of a flat bed without using bedrails? 4 - None   3. Moving to and from a bed to a chair (including a wheelchair)? 3 - A Little   4. Standing up from a chair using your arms (e.g., wheelchair, or bedside chair)? 3 - A Little   5. To walk in hospital room? 3 - A Little   6. Climbing 3-5 steps with a railing? 3 - A Little   Basic Mobility Raw Score (Score out of 24.Lower scores equate to lower levels of function) 20   Total Evaluation Time   Total Evaluation Time (Minutes) 10     "

## 2020-01-12 NOTE — PLAN OF CARE
OT: Order received, eval completed and treatment initiated. Pt is a 74 year old female admitted with acute hypoxic respiratory failure second to RSV infection causing acute COPD exacerbation. Pt lives alone in [facility] apartment, no environmental concerns, walk in shower, RTS. Pt reports indep in all ADLS, IADLs and mobility tasks with no AD at baseline.    Discharge Planner OT   Patient plan for discharge: Home to [facility]  Current status: Pt completed bed mobility supine <> sit EOB with supervision. Pt completed transfers/mobility in room with SBA, assist provided for O2 cord mgmt. Pt completed toilet transfer and toileting tasks with SBA, completed walk in shower transfer with CGA and use of grab bar. Pt able to complete LB dressing while seated with SBA. SOB noted with activity, O2 sats 88% on 1LPM post activity, did require increased time with seated rest break and PLB technique to improve to >90%. Frequent coughing episodes during session.   Barriers to return to prior living situation: Decreased functional activity tolerance, SOB, O2 needs  Recommendations for discharge: Home to [facility]  Rationale for recommendations: Anticipate with ongoing skilled OT while IP, pt will progress to meet goals for safe return home.        Entered by: Angelica Cardenas 01/12/2020 2:35 PM

## 2020-01-12 NOTE — PLAN OF CARE
Discharge Planner PT   Patient plan for discharge: to return back to her group home. She was independent with gait without a device and no home Oxygen at baseline.  She reports that she may be open to additional services through the facility if needed HHPT if needed depending on insurance coverage as well as cost.   Current status: Transfers with SBA,  toileting with SBA, gait with IV pole and  feet with supplemental oxygen on 2 L, sats 94-96% with gait, HR up to 110 bpm. Occasional weave from straight path of gait.   Fatigue and increased dypnea with gait and activity.   Pt is on 1.5 l per n/c in room and sats.  Barriers to return to prior living situation: decreased balance with gait and standing without UE support due to weakness. Decreased tolerance to activity.   Recommendations for discharge: return to [facility], at this time recommend HHPT services.   Rationale for recommendations: Pt deconditioned, decreased tolerance to activity, decreased balance without UE support.  Anticipate improvement with continued IP PT to allow her to safely return home.       She may benefit from use of 4WW, not just for balance but to allow her to walk further distances, more upright posture which would over all increase her tolerance to the walking and therefore strength.     Recommend pt ambulating with nursing as well 2-3 times a day.        Entered by: Lucy Chung 01/12/2020 11:30 AM

## 2020-01-12 NOTE — CONSULTS
Care Transition Initial Assessment - RN        Met with: Patient.  DATA   Active Problems:    COPD with acute exacerbation (H)    SOB    Cognitive Status: awake, alert and oriented.  Primary Care Clinic Name: [facility]  Primary Care MD Name: Dr. Althea Rai  Contact information and PCP information verified: Yes  Lives With: alone([facility])      Quality of Family Relationships: involved, supportive  Description of Support System: Supportive, Involved   Who is your support system?: Children, Neighbor(Facility staff if needed)   Support Assessment: Adequate family and caregiver support   Insurance concerns: No Insurance issues identified     ASSESSMENT  Patient currently receives the following services:  none        Identified issues/concerns regarding health management: Patient admitted 1/11 with COPD exacerbation, RSV URI. Patient has a history of COPD, Rheumatoid Arthritis on immunosuppressive therapy, HTN, CAD and 30+year smoker. Met with patient at bedside for COPD education and discharge planning. Patient living at [facility]. She does not receive any services.  States she can request additional services prn for a fee. Patient reports having a supportive son, daughter and facility.  She is independent in medication management and ADL's. Patient ambulates w/o walker or cane.  Patient manages her COPD with daily Trelegy inhaler. Patient rarely uses her rescue inhaler; states she used it once prior to admission. She does not have home Oxygen or nebulizer. Patient currently smokes 3-4 cigarettes/day. I encouraged smoking cessation. States smoking cessation has been difficult d/t life changes: death of her parents, divorce. She is followed by her PMD, Dr. Althea Rai with [facility]. Patient reports not having a Pulmonologist. States her COPD has been manageable until recently.  Reviewed COPD action plan, information given to patient. Encouraged post hospitalization  follow up with PCP and connecting with a pulmonologist. Encouraged having family member accompany her to f/u visit. A post hospitalization follow up visit not scheduled at this time as patient receiving medical workup and discharge currently un-determine.     Patient may benefit from home care services if she returns to her ILF at discharge.     A handoff will be sent to PCP clinic care coordinator when discharged.     PLAN  Financial costs for the patient include none .  Patient given options and choices for discharge Yes: patient also pending PT/OT eval.  Patient anticipates discharging to ILF .        Patient anticipates needs for home equipment: TBD pending oxygenation needs  Transportation/person available to transport on day of discharge  is family to provide.     Care  (CTS) will continue to follow as needed.    Angelic Shaw RN BSN   Inpatient Care Coordination   [facility]   [phone]

## 2020-01-12 NOTE — PLAN OF CARE
Patient continues to require 2Lo2. Up with SBA to BR Q2-3*- very [place]. Lungs dim with fine crackles. Harsh cough triggered by talking, laughing, any activity. IVF @ 100. Declines nicotine patch. Slept between cares but awake coughing several times during the night.

## 2020-01-12 NOTE — PROGRESS NOTES
[facility]    Medicine Progress Note - Hospitalist Service       Date of Admission:  1/11/2020  Length of stay: 1 days    Assessment & Plan   Avery Arteaga is a 74-year-old female with a history of tobacco abuse, rheumatoid arthritis on methotrexate, hypertension and COPD who is admitted to the hospitalist service with acute hypoxic respiratory failure second to RSV infection causing acute COPD exacerbation.    Today:   Patient remains short of breath and needing oxygen to maintain saturations.   Continue prednisone 60 daily.    Discussed chest CT findings with the patient.     Acute hypoxic respiratory failure secondary to acute COPD exacerbation. Viral URI infection. Organism RSV  Unknown if she had had formal PFT testing. >30 pk/yr smoking history indicating presumed COPD. Severe emphysema seen on chest CT. Increased risk of opportunistic infection due to immunosuppressive therapy associated with RA.   Viral swab positive for RSV B.  -Prednisone 60 mg daily x 5 days  -Duo-nebulizers q 4 hours; albuterol neb prn  -Home inhalers: albuterol, fluticasone-Umeclidin-Vilanterol   -Hold abx for now. No focal infiltrate on CXR or increased sputum production. Afebrile and no leucocytosis  -Incentive spirometry   - Consider antiviral treatment for RSV if significant decompensation.  - Needing 2lpm to maintain sats. Not on home O2.   - Patient should have outpatient follow up with pulmonology for COPD as well as lung nodules.     Acute respiratory acidosis secondary to COPD exacerbation  VBG pH 7.25 pCO2 61   -Monitor respiratory status. If change in mentation or continued increase work of breathing repeat blood gas and consider BiPAP therapy. Discussed with patient.     Rheumatoid Arthritis  -Methotrexate 22.5 mg q 7days (Monday)  -Leflunomide 10 mg daily   -Follows OP with Rheumatology      1.0 cm pulmonary nodule  CXR showed a 1.0 cm pulmonary nodule projected over the mid-right lung.  CT PE protocol scan  "3/2/18 did not show a lung nodule.Unsure if she had lung CA screening.   Noncontrast CT of the chest showed several indeterminate pulmonary nodules, one in the right lower lobe 15 x 7 x 9 mm.  Patient needs pulmonary consultation with possible PET/CT in the outpatient setting.  This was discussed with her on 1/12 in the presence of her son.  Patient stated she planned to make the appointment with pulmonology.     Hypertension  BP elevated on admission at 164/95 due to increased work of breathing and stress of acute illness. Continue home medications and monitor response as respiratory status improves.   -Atenolol 25 mg daily  -Lisinopril 5 mg adily       Nicotine dependence  Active smoker since 18 yrs/old. Currently smoking 4-5 cig/day  -Encourage smoking cessation  -Declined nicotine patch. 7 mg/day patch prn      DVT Prophylaxis: Lovenox  Code Status: DNR/DNI  Expected discharge: 2 - 3 days, recommended to prior living arrangement once respiratory status improves and not needing oxygen.    Long Colon MD  Hospitalist Service  [facility]  ______________________________________________________________________    Interval History   No acute overnight events.    Breathing is still short today. She feels very fatigued as well. No chest pain. Otherwise feeling OK.    Data reviewed today: I reviewed all medications, new labs and imaging results over the last 24 hours.     Physical Exam   BP (!) 158/63   Pulse 98   Temp 95.8  F (35.4  C) (Oral)   Resp 22   Ht 1.676 m (5' 5.98\")   Wt 73.7 kg (162 lb 6.4 oz)   SpO2 95%   BMI 26.22 kg/m    162 lbs 6.4 oz       General: Increased work of breathing.    HEENT: No scleral icterus. Oropharynx moist.     Neck: Supple.    Pulmonary: Mild respiratory distress, increased work of breathing. Unable to speak in full sentences. Slight wheeze throughout.    Cardiovascular: Regular rate and rhythm without murmur or extra heart sounds.    Abdomen: Soft and " non-tender.    Extremities: No peripheral edema. No clubbing or cyanosis.     Neurologic: Awake, alert, appropriate.    Skin: Warm and dry.    Psychiatric: Normal affect and mood.     Data    Recent Labs   Lab 01/12/20  0728 01/11/20  1242   WBC  --  7.6   HGB  --  12.9   MCV  --  100   PLT  --  220    131*   POTASSIUM 4.3 4.3   CHLORIDE 111* 98   CO2 22 29   BUN 13 14   CR 0.75 0.80   ANIONGAP 6 4   NARESH 8.2* 8.7   * 115*   ALBUMIN  --  3.1*   PROTTOTAL  --  8.0   BILITOTAL  --  0.4   ALKPHOS  --  87   ALT  --  27   AST  --  31   TROPI  --  <0.015     Recent Results (from the past 24 hour(s))   XR Chest 2 Views    Narrative    CHEST TWO VIEWS 1/11/2020 1:14 PM     HISTORY: Cough, SOB, wheezing, concern for flu like symptoms.   Evaluate for pneumonia.    COMPARISON: None.    FINDINGS: There is a 1.0 cm pulmonary nodule projected over the mid  right lung. No evidence of pneumonia, pneumothorax, or pleural  effusion. Heart size normal.       Impression    IMPRESSION: 1.0 cm solitary pulmonary nodule. Consider CT scan of the  chest for further dilation.     SUE MONTOYA MD   CT Chest w/o Contrast    Narrative    EXAM: CT CHEST W/O CONTRAST  LOCATION: [facility]  DATE/TIME: 1/11/2020 6:20 PM    INDICATION: Lung nodule (Pulmonary nodule)  Acute resp illness, > 40 years old  COMPARISON: None.  TECHNIQUE: CT chest without IV contrast. Multiplanar reformats were obtained. Dose reduction techniques were used.  CONTRAST: None.    FINDINGS:   LUNGS AND PLEURA: Respiratory motion partially obscures the lungs limiting sensitivity, especially nodule characterization. There is a suspicious nodule in the superior segment of the right lower lobe, partially obscured by motion, with subtle spiculated   margins measuring 15 mm x 7 mm x 9 mm. No comparison exam available.    There is diffuse bronchial wall thickening. Numerous tree-in-bud densities cluster the right middle lobe and probably at the bases  peripherally. There is severe emphysema. There focal irregular densities in the upper lungs bilaterally anteriorly; this   appears to be an area of scarring in the right upper lobe with associated bronchiolectasis. The lesion in the left upper lung has a more solid appearance and measures 13 x 8 mm without spiculated margins notably. There are a few additional smaller   nodules in the lungs bilaterally.    No pleural effusion or pneumothorax.    MEDIASTINUM/AXILLAE: No thoracic aortic aneurysm. Pulmonary arteries normal in caliber. Mild aortic valve calcification correlate for possible stenosis. No pericardial effusion.    UPPER ABDOMEN: Cholelithiasis.    MUSCULOSKELETAL: Unremarkable.      Impression    IMPRESSION:   1.  Severe emphysema. Mild bronchitis/bronchiolitis also suggested.  2.  Several indeterminate pulmonary nodules, largest and most suspicious in the superior segment of the right lower lobe measures 15 x 7 x 9 mm. No comparison exams available. Recommend follow-up pulmonary consultation. PET/CT may be reasonable next step   in evaluation unless comparison exams are available to document stability.         Medications      Current Facility-Administered Medications   Medication Dose Route Frequency     aspirin  81 mg Oral Daily     atenolol  25 mg Oral Daily     enoxaparin ANTICOAGULANT  40 mg Subcutaneous Q24H     ipratropium - albuterol 0.5 mg/2.5 mg/3 mL  3 mL Nebulization 4x daily     leflunomide  10 mg Oral Daily     lisinopril  5 mg Oral Daily     nicotine   Transdermal Q8H     predniSONE  60 mg Oral Daily     senna-docusate  1 tablet Oral BID    Or     senna-docusate  2 tablet Oral BID     sodium chloride (PF)  3 mL Intracatheter Q8H

## 2020-01-13 ENCOUNTER — APPOINTMENT (OUTPATIENT)
Dept: PHYSICAL THERAPY | Facility: CLINIC | Age: 75
DRG: 190 | End: 2020-01-13
Payer: COMMERCIAL

## 2020-01-13 PROCEDURE — 99233 SBSQ HOSP IP/OBS HIGH 50: CPT | Performed by: INTERNAL MEDICINE

## 2020-01-13 PROCEDURE — 25000131 ZZH RX MED GY IP 250 OP 636 PS 637: Performed by: INTERNAL MEDICINE

## 2020-01-13 PROCEDURE — 25000128 H RX IP 250 OP 636: Performed by: INTERNAL MEDICINE

## 2020-01-13 PROCEDURE — 40000275 ZZH STATISTIC RCP TIME EA 10 MIN

## 2020-01-13 PROCEDURE — 97110 THERAPEUTIC EXERCISES: CPT | Mod: GP | Performed by: PHYSICAL THERAPIST

## 2020-01-13 PROCEDURE — 94640 AIRWAY INHALATION TREATMENT: CPT | Mod: 76

## 2020-01-13 PROCEDURE — 97530 THERAPEUTIC ACTIVITIES: CPT | Mod: GP | Performed by: PHYSICAL THERAPIST

## 2020-01-13 PROCEDURE — 97116 GAIT TRAINING THERAPY: CPT | Mod: GP | Performed by: PHYSICAL THERAPIST

## 2020-01-13 PROCEDURE — 25000132 ZZH RX MED GY IP 250 OP 250 PS 637: Performed by: INTERNAL MEDICINE

## 2020-01-13 PROCEDURE — 12000000 ZZH R&B MED SURG/OB

## 2020-01-13 PROCEDURE — 25000125 ZZHC RX 250: Performed by: INTERNAL MEDICINE

## 2020-01-13 PROCEDURE — 94640 AIRWAY INHALATION TREATMENT: CPT

## 2020-01-13 RX ORDER — HYDRALAZINE HYDROCHLORIDE 20 MG/ML
10 INJECTION INTRAMUSCULAR; INTRAVENOUS EVERY 4 HOURS PRN
Status: DISCONTINUED | OUTPATIENT
Start: 2020-01-13 | End: 2020-01-15 | Stop reason: HOSPADM

## 2020-01-13 RX ADMIN — IPRATROPIUM BROMIDE AND ALBUTEROL SULFATE 3 ML: .5; 3 SOLUTION RESPIRATORY (INHALATION) at 15:59

## 2020-01-13 RX ADMIN — ONDANSETRON 4 MG: 4 TABLET, ORALLY DISINTEGRATING ORAL at 05:02

## 2020-01-13 RX ADMIN — IPRATROPIUM BROMIDE AND ALBUTEROL SULFATE 3 ML: .5; 3 SOLUTION RESPIRATORY (INHALATION) at 08:41

## 2020-01-13 RX ADMIN — ENOXAPARIN SODIUM 40 MG: 40 INJECTION SUBCUTANEOUS at 17:57

## 2020-01-13 RX ADMIN — LEFLUNOMIDE 10 MG: 10 TABLET ORAL at 08:28

## 2020-01-13 RX ADMIN — IPRATROPIUM BROMIDE AND ALBUTEROL SULFATE 3 ML: .5; 3 SOLUTION RESPIRATORY (INHALATION) at 19:50

## 2020-01-13 RX ADMIN — ATENOLOL 25 MG: 25 TABLET ORAL at 08:28

## 2020-01-13 RX ADMIN — PREDNISONE 60 MG: 20 TABLET ORAL at 08:28

## 2020-01-13 RX ADMIN — LISINOPRIL 5 MG: 5 TABLET ORAL at 08:27

## 2020-01-13 RX ADMIN — SENNOSIDES AND DOCUSATE SODIUM 1 TABLET: 8.6; 5 TABLET ORAL at 21:05

## 2020-01-13 RX ADMIN — ASPIRIN 81 MG: 81 TABLET, COATED ORAL at 08:28

## 2020-01-13 ASSESSMENT — ACTIVITIES OF DAILY LIVING (ADL)
ADLS_ACUITY_SCORE: 14

## 2020-01-13 ASSESSMENT — MIFFLIN-ST. JEOR: SCORE: 1262.21

## 2020-01-13 NOTE — PLAN OF CARE
Pt remains admitted for COPD ex/RSV  A/Ox4  No pain or nausea reported  O2 weaned to 1L, increased need with activity   Up independently with walker  Regular diet  Discharge pending  Will continue to monitor

## 2020-01-13 NOTE — PLAN OF CARE
Improving, continues to use 0.5-1L o2 and BROOKS but better from previous night. Severe dry cough. Up ind/walker. Nausea- zofran x1. Saline locked.

## 2020-01-13 NOTE — PROGRESS NOTES
Patient has been assessed for Home Oxygen needs.  Oxygen readings:   *   RA - at rest  Pulse oximetry SPO2 92 %  *   RA - during activity/with exercise SPO2 83 %  *   O2 at  2 liters/minute (at rest) ...SPO2 93 %  *   O2 at  2 liters/minute (during activity/with exercise) ...SPO2 88 %

## 2020-01-13 NOTE — PLAN OF CARE
Discharge Planner PT   Patient plan for discharge: return to her ILF  Current status: Pt sleeping in bed upon therapist's arrival. Skilled monitoring of vitals with activity. O2 sats 88% on 1L, HR 91.  Sup > sit and sit <> stand SBA.  O2 increased to 2L for gait.  Pt amb ~200' with FWW and SBA on 2L with slow steady gait.  O2 sats 89%, HR 99 after gait.  Pt recovered to 95% in ~ 1 1/2 min.  Pt also amb 20' x 2 in room without AD with SBA and no unsteadiness.    Barriers to return to prior living situation: . Decreased tolerance to activity.   Recommendations for discharge: return to ILF, at this time recommend HHPT services.   Rationale for recommendations: Pt deconditioned, decreased tolerance to activity,  Anticipate improvement with continued IP PT to allow her to safely return home.       She may benefit from use of 4WW, not just for balance but to allow her to walk further distances, more upright posture which would over all increase her tolerance to the walking and therefore strength.     Recommend pt ambulating with nursing as well 2-3 times a day.        Entered by: Josi Zavala 01/13/2020 10:58 AM

## 2020-01-13 NOTE — PLAN OF CARE
Pt remains admitted for COPD ex/RSV  A/Ox4  No pain or nausea reported  O2 weaned to 0.5L, more needed for activity   On PO prednisone  Up independently with walker  Regular diet  Discharge pending  Will continue to monitor

## 2020-01-13 NOTE — PROGRESS NOTES
[facility]    Medicine Progress Note - Hospitalist Service       Date of Admission:  1/11/2020  Assessment & Plan       Avery Arteaga is a 74-year-old female with a history of tobacco abuse, rheumatoid arthritis on methotrexate, hypertension and COPD who is admitted to the hospitalist service with acute hypoxic respiratory failure second to RSV infection causing acute COPD exacerbation.    Acute hypoxic respiratory failure secondary to acute COPD exacerbation. Viral URI infection. Organism RSV  Unknown if she had had formal PFT testing. >30 pk/yr smoking history indicating presumed COPD. Severe emphysema seen on chest CT. Increased risk of opportunistic infection due to immunosuppressive therapy associated with RA.   Viral swab positive for RSV B.  -Prednisone 60 mg daily x 5 days  -Duo-nebulizers q 4 hours; albuterol neb prn  -Home inhalers: albuterol, fluticasone-Umeclidin-Vilanterol   -Hold abx for now. No focal infiltrate on CXR or increased sputum production. Afebrile and no leucocytosis  -Incentive spirometry   - Consider antiviral treatment for RSV if significant decompensation.  - Needing 2lpm to maintain sats. Not on home O2.   - Patient should have outpatient follow up with pulmonology for COPD as well as lung nodules.      Acute respiratory acidosis secondary to COPD exacerbation  VBG pH 7.25 pCO2 61   -Monitor respiratory status. If change in mentation or continued increase work of breathing repeat blood gas and consider BiPAP therapy. Discussed with patient.     Rheumatoid Arthritis  -Methotrexate 22.5 mg q 7days (Monday)  -Leflunomide 10 mg daily   -Follows OP with Rheumatology      1.0 cm pulmonary nodule  CXR showed a 1.0 cm pulmonary nodule projected over the mid-right lung.  CT PE protocol scan 3/2/18 did not show a lung nodule.Unsure if she had lung CA screening.   Noncontrast CT of the chest showed several indeterminate pulmonary nodules, one in the right lower lobe 15 x 7 x 9 mm.   Patient needs pulmonary consultation with possible PET/CT in the outpatient setting.  This was discussed with her on 1/12 in the presence of her son.  Patient stated she planned to make the appointment with pulmonology.     Hypertension  BP elevated on admission at 164/95 due to increased work of breathing and stress of acute illness. Continue home medications and monitor response as respiratory status improves.   -Atenolol 25 mg daily  -Lisinopril 5 mg adily       Nicotine dependence  Active smoker since 18 yrs/old. Currently smoking 4-5 cig/day  -Encourage smoking cessation  -Declined nicotine patch. 7 mg/day patch prn       Diet: Combination Diet Regular Diet Adult    DVT Prophylaxis: Enoxaparin (Lovenox) SQ  Marmolejo Catheter: not present  Code Status: DNR/DNI      Disposition Plan   Expected discharge: 2 - 3 days, recommended to prior living arrangement once O2 use less than 1 liters/minute.  Entered: Susan Perez MD 01/13/2020, 11:02 AM       The patient's care was discussed with the Patient.    Susan Perez MD  Hospitalist Service  [facility]    ______________________________________________________________________    Interval History     No fevers/chills. No productive cough.     Data reviewed today: I reviewed all medications, new labs and imaging results over the last 24 hours. I personally reviewed no images or EKG's today.    Physical Exam   Vital Signs: Temp: 97.1  F (36.2  C) Temp src: Oral BP: (!) 170/81   Heart Rate: 109 Resp: 20 SpO2: 91 % O2 Device: Nasal cannula Oxygen Delivery: 1 LPM  Weight: 164 lbs 6.4 oz    Gen - AAO x 3 in nAD.  Lungs - diminished BS.  Heart - RR,S1+S2 nml, no m/g/r.  Abd - soft, NT, ND, + BS.  Ext - no edema.    Data   Recent Labs   Lab 01/12/20  0728 01/11/20  1242   WBC  --  7.6   HGB  --  12.9   MCV  --  100   PLT  --  220    131*   POTASSIUM 4.3 4.3   CHLORIDE 111* 98   CO2 22 29   BUN 13 14   CR 0.75 0.80   ANIONGAP 6 4   NARESH 8.2* 8.7   *  115*   ALBUMIN  --  3.1*   PROTTOTAL  --  8.0   BILITOTAL  --  0.4   ALKPHOS  --  87   ALT  --  27   AST  --  31   TROPI  --  <0.015     No results found for this or any previous visit (from the past 24 hour(s)).  Medications       aspirin  81 mg Oral Daily     atenolol  25 mg Oral Daily     enoxaparin ANTICOAGULANT  40 mg Subcutaneous Q24H     ipratropium - albuterol 0.5 mg/2.5 mg/3 mL  3 mL Nebulization 4x daily     leflunomide  10 mg Oral Daily     lisinopril  5 mg Oral Daily     nicotine   Transdermal Q8H     predniSONE  60 mg Oral Daily     senna-docusate  1 tablet Oral BID    Or     senna-docusate  2 tablet Oral BID     sodium chloride (PF)  3 mL Intracatheter Q8H

## 2020-01-14 ENCOUNTER — APPOINTMENT (OUTPATIENT)
Dept: OCCUPATIONAL THERAPY | Facility: CLINIC | Age: 75
DRG: 190 | End: 2020-01-14
Payer: COMMERCIAL

## 2020-01-14 LAB — PLATELET # BLD AUTO: 243 10E9/L (ref 150–450)

## 2020-01-14 PROCEDURE — 36415 COLL VENOUS BLD VENIPUNCTURE: CPT | Performed by: INTERNAL MEDICINE

## 2020-01-14 PROCEDURE — 12000000 ZZH R&B MED SURG/OB

## 2020-01-14 PROCEDURE — 25000132 ZZH RX MED GY IP 250 OP 250 PS 637: Performed by: INTERNAL MEDICINE

## 2020-01-14 PROCEDURE — 94640 AIRWAY INHALATION TREATMENT: CPT

## 2020-01-14 PROCEDURE — 25000125 ZZHC RX 250: Performed by: INTERNAL MEDICINE

## 2020-01-14 PROCEDURE — 97535 SELF CARE MNGMENT TRAINING: CPT | Mod: GO

## 2020-01-14 PROCEDURE — 99232 SBSQ HOSP IP/OBS MODERATE 35: CPT | Performed by: INTERNAL MEDICINE

## 2020-01-14 PROCEDURE — 25000128 H RX IP 250 OP 636: Performed by: INTERNAL MEDICINE

## 2020-01-14 PROCEDURE — 94640 AIRWAY INHALATION TREATMENT: CPT | Mod: 76

## 2020-01-14 PROCEDURE — 99207 ZZC CDG-MDM COMPONENT: MEETS LOW - DOWN CODED: CPT | Performed by: INTERNAL MEDICINE

## 2020-01-14 PROCEDURE — 40000275 ZZH STATISTIC RCP TIME EA 10 MIN

## 2020-01-14 PROCEDURE — 85049 AUTOMATED PLATELET COUNT: CPT | Performed by: INTERNAL MEDICINE

## 2020-01-14 PROCEDURE — 25000131 ZZH RX MED GY IP 250 OP 636 PS 637: Performed by: INTERNAL MEDICINE

## 2020-01-14 RX ORDER — AMLODIPINE BESYLATE 2.5 MG/1
2.5 TABLET ORAL DAILY
Status: DISCONTINUED | OUTPATIENT
Start: 2020-01-14 | End: 2020-01-15 | Stop reason: HOSPADM

## 2020-01-14 RX ADMIN — ASPIRIN 81 MG: 81 TABLET, COATED ORAL at 08:41

## 2020-01-14 RX ADMIN — IPRATROPIUM BROMIDE AND ALBUTEROL SULFATE 3 ML: .5; 3 SOLUTION RESPIRATORY (INHALATION) at 20:39

## 2020-01-14 RX ADMIN — SENNOSIDES AND DOCUSATE SODIUM 1 TABLET: 8.6; 5 TABLET ORAL at 20:02

## 2020-01-14 RX ADMIN — IPRATROPIUM BROMIDE AND ALBUTEROL SULFATE 3 ML: .5; 3 SOLUTION RESPIRATORY (INHALATION) at 15:52

## 2020-01-14 RX ADMIN — ENOXAPARIN SODIUM 40 MG: 40 INJECTION SUBCUTANEOUS at 17:48

## 2020-01-14 RX ADMIN — GUAIFENESIN 10 ML: 100 SOLUTION ORAL at 07:34

## 2020-01-14 RX ADMIN — IPRATROPIUM BROMIDE AND ALBUTEROL SULFATE 3 ML: .5; 3 SOLUTION RESPIRATORY (INHALATION) at 08:34

## 2020-01-14 RX ADMIN — IPRATROPIUM BROMIDE AND ALBUTEROL SULFATE 3 ML: .5; 3 SOLUTION RESPIRATORY (INHALATION) at 12:04

## 2020-01-14 RX ADMIN — PREDNISONE 60 MG: 20 TABLET ORAL at 08:41

## 2020-01-14 RX ADMIN — ATENOLOL 25 MG: 25 TABLET ORAL at 08:41

## 2020-01-14 RX ADMIN — AMLODIPINE BESYLATE 2.5 MG: 2.5 TABLET ORAL at 11:50

## 2020-01-14 RX ADMIN — LISINOPRIL 5 MG: 5 TABLET ORAL at 08:41

## 2020-01-14 RX ADMIN — LEFLUNOMIDE 10 MG: 10 TABLET ORAL at 08:41

## 2020-01-14 ASSESSMENT — ACTIVITIES OF DAILY LIVING (ADL)
ADLS_ACUITY_SCORE: 14

## 2020-01-14 NOTE — PLAN OF CARE
Slept well, intermittent cough continues. Declines cough suppressant. BROOKS, 1L o2. Uneventful night.

## 2020-01-14 NOTE — PLAN OF CARE
PT: Pt attempted for session. Walking in hallway with nursing. Appears to be tolerating well, steady on feet.

## 2020-01-14 NOTE — PLAN OF CARE
Discharge Planner OT   Patient plan for discharge: Home to [facility]  Current status: Pt completed bed mobility supine > sit indep. Pt ambulated in room, to/from BR with no assistive device indep, good demo of safe mgmt of O2 cord. Pt able to don robe indep. Pt issued handouts and educated on EC/WS strategies to incorporate into daily routine, including bathing and IADLS. Pt able to identify ways to utilize info in home environment. Education provided on progressing activity, verbalized understanding. Pt has achieved OT goals. No further skilled OT needs identified.   Barriers to return to prior living situation: Decreased functional activity tolerance, SOB, O2 needs  Recommendations for discharge: Home to [facility]  Rationale for recommendations: Pt has achieved OT goals. Good understanding of EC/WS strategies.        Entered by: Angelica Cardenas 01/14/2020 3:51 PM       Occupational Therapy Discharge Summary    Reason for therapy discharge:    All goals and outcomes met, no further needs identified.    Progress towards therapy goal(s). See goals on Care Plan in [facility] electronic health record for goal details.  Goals met    Therapy recommendation(s):    No further skilled OT needs identified.

## 2020-01-14 NOTE — PROGRESS NOTES
[facility]    Medicine Progress Note - Hospitalist Service       Date of Admission:  1/11/2020  Assessment & Plan      Avery Arteaga is a 74-year-old female with a history of tobacco abuse, rheumatoid arthritis on methotrexate, hypertension and COPD who is admitted to the hospitalist service with acute hypoxic respiratory failure second to RSV infection causing acute COPD exacerbation.     Acute hypoxic respiratory failure secondary to acute COPD exacerbation. Viral URI infection. Organism RSV  Unknown if she had had formal PFT testing. >30 pk/yr smoking history indicating presumed COPD. Severe emphysema seen on chest CT. Increased risk of opportunistic infection due to immunosuppressive therapy associated with RA.   Viral swab positive for RSV B.  -Prednisone 60 mg daily x 5 days  -Duo-nebulizers q 4 hours; albuterol neb prn  -Home inhalers: albuterol, fluticasone-Umeclidin-Vilanterol   -Hold abx for now. No focal infiltrate on CXR or increased sputum production. Afebrile and no leucocytosis  -Incentive spirometry   - Consider antiviral treatment for RSV if significant decompensation.  - Needing 2lpm to maintain sats. Not on home O2.   - Patient should have outpatient follow up with pulmonology for COPD as well as lung nodules.      Acute respiratory acidosis secondary to COPD exacerbation  VBG pH 7.25 pCO2 61   -Monitor respiratory status. If change in mentation or continued increase work of breathing repeat blood gas and consider BiPAP therapy. Discussed with patient.     Rheumatoid Arthritis  -Methotrexate 22.5 mg q 7days (Monday)  -Leflunomide 10 mg daily   -Follows OP with Rheumatology      1.0 cm pulmonary nodule  CXR showed a 1.0 cm pulmonary nodule projected over the mid-right lung.  CT PE protocol scan 3/2/18 did not show a lung nodule.Unsure if she had lung CA screening.   Noncontrast CT of the chest showed several indeterminate pulmonary nodules, one in the right lower lobe 15 x 7 x 9 mm.   Patient needs pulmonary consultation with possible PET/CT in the outpatient setting.  This was discussed with her on 1/12 in the presence of her son.  Patient stated she planned to make the appointment with pulmonology.     Hypertension  BP elevated on admission at 164/95 due to increased work of breathing and stress of acute illness. Continue home medications and monitor response as respiratory status improves.   -Atenolol 25 mg daily  -Lisinopril 5 mg PO.  - start PO norvasc 2.5 mg q day.      Nicotine dependence  Active smoker since 18 yrs/old. Currently smoking 4-5 cig/day  -Encourage smoking cessation  -Declined nicotine patch. 7 mg/day patch prn       Diet: Combination Diet Regular Diet Adult    DVT Prophylaxis: Enoxaparin (Lovenox) SQ  Marmolejo Catheter: not present  Code Status: DNR/DNI      Disposition Plan   Expected discharge: 1-2 days, recommended to prior living arrangement once breathing better.  Entered: Susan Perez MD 01/14/2020, 10:26 AM       The patient's care was discussed with the Bedside Nurse and Patient.    Susan Perez MD  Hospitalist Service  [facility]    ______________________________________________________________________    Interval History     + NPC. No fevers/chills. +SOB but improving.    Data reviewed today: I reviewed all medications, new labs and imaging results over the last 24 hours. I personally reviewed no images or EKG's today.    Physical Exam   Vital Signs: Temp: 95.4  F (35.2  C) Temp src: Oral BP: (!) 172/84   Heart Rate: 83 Resp: 28 SpO2: 91 % O2 Device: Nasal cannula Oxygen Delivery: 1 LPM  Weight: 164 lbs 6.4 oz    Gen - AAO x 3 in nAD.  Lungs - diminished BS with exp wheezing.  Heart - RR,S1+S2 nml, no m/g/r.  Abd - soft, NT, ND, + BS.  Ext - no edema.    Data   Recent Labs   Lab 01/12/20  0728 01/11/20  1242   WBC  --  7.6   HGB  --  12.9   MCV  --  100   PLT  --  220    131*   POTASSIUM 4.3 4.3   CHLORIDE 111* 98   CO2 22 29   BUN 13 14    CR 0.75 0.80   ANIONGAP 6 4   NARESH 8.2* 8.7   * 115*   ALBUMIN  --  3.1*   PROTTOTAL  --  8.0   BILITOTAL  --  0.4   ALKPHOS  --  87   ALT  --  27   AST  --  31   TROPI  --  <0.015     No results found for this or any previous visit (from the past 24 hour(s)).  Medications       aspirin  81 mg Oral Daily     atenolol  25 mg Oral Daily     enoxaparin ANTICOAGULANT  40 mg Subcutaneous Q24H     ipratropium - albuterol 0.5 mg/2.5 mg/3 mL  3 mL Nebulization 4x daily     leflunomide  10 mg Oral Daily     lisinopril  5 mg Oral Daily     nicotine   Transdermal Q8H     predniSONE  60 mg Oral Daily     senna-docusate  1 tablet Oral BID    Or     senna-docusate  2 tablet Oral BID     sodium chloride (PF)  3 mL Intracatheter Q8H

## 2020-01-15 ENCOUNTER — DOCUMENTATION ONLY (OUTPATIENT)
Dept: MEDSURG UNIT | Facility: CLINIC | Age: 75
End: 2020-01-15

## 2020-01-15 VITALS
HEIGHT: 66 IN | HEART RATE: 93 BPM | BODY MASS INDEX: 26.42 KG/M2 | SYSTOLIC BLOOD PRESSURE: 127 MMHG | DIASTOLIC BLOOD PRESSURE: 80 MMHG | WEIGHT: 164.4 LBS | OXYGEN SATURATION: 92 % | RESPIRATION RATE: 20 BRPM | TEMPERATURE: 96.5 F

## 2020-01-15 LAB — LACTATE BLD-SCNC: 1.7 MMOL/L (ref 0.7–2)

## 2020-01-15 PROCEDURE — 25000132 ZZH RX MED GY IP 250 OP 250 PS 637: Performed by: INTERNAL MEDICINE

## 2020-01-15 PROCEDURE — 83605 ASSAY OF LACTIC ACID: CPT | Performed by: INTERNAL MEDICINE

## 2020-01-15 PROCEDURE — 36415 COLL VENOUS BLD VENIPUNCTURE: CPT | Performed by: INTERNAL MEDICINE

## 2020-01-15 PROCEDURE — 40000809 ZZH STATISTIC NO DOCUMENTATION TO SUPPORT CHARGE

## 2020-01-15 PROCEDURE — 94640 AIRWAY INHALATION TREATMENT: CPT | Mod: 76

## 2020-01-15 PROCEDURE — 25000131 ZZH RX MED GY IP 250 OP 636 PS 637: Performed by: INTERNAL MEDICINE

## 2020-01-15 PROCEDURE — 40000275 ZZH STATISTIC RCP TIME EA 10 MIN

## 2020-01-15 PROCEDURE — 99239 HOSP IP/OBS DSCHRG MGMT >30: CPT | Performed by: INTERNAL MEDICINE

## 2020-01-15 PROCEDURE — 25000125 ZZHC RX 250: Performed by: INTERNAL MEDICINE

## 2020-01-15 PROCEDURE — 94640 AIRWAY INHALATION TREATMENT: CPT

## 2020-01-15 RX ORDER — GUAIFENESIN 600 MG/1
1200 TABLET, EXTENDED RELEASE ORAL 2 TIMES DAILY
Qty: 30 TABLET | Refills: 0 | Status: SHIPPED | OUTPATIENT
Start: 2020-01-15 | End: 2020-03-31

## 2020-01-15 RX ORDER — AMLODIPINE BESYLATE 2.5 MG/1
2.5 TABLET ORAL DAILY
Qty: 30 TABLET | Refills: 0 | Status: SHIPPED | OUTPATIENT
Start: 2020-01-16 | End: 2020-03-31

## 2020-01-15 RX ORDER — PREDNISONE 20 MG/1
TABLET ORAL
Qty: 20 TABLET | Refills: 0 | Status: SHIPPED | OUTPATIENT
Start: 2020-01-15 | End: 2020-03-31

## 2020-01-15 RX ORDER — IPRATROPIUM BROMIDE AND ALBUTEROL SULFATE 2.5; .5 MG/3ML; MG/3ML
3 SOLUTION RESPIRATORY (INHALATION) EVERY 6 HOURS PRN
Qty: 1 BOX | Refills: 0 | Status: ON HOLD | OUTPATIENT
Start: 2020-01-15 | End: 2020-05-21

## 2020-01-15 RX ORDER — BENZONATATE 100 MG/1
100 CAPSULE ORAL 3 TIMES DAILY PRN
Qty: 30 CAPSULE | Refills: 0 | Status: SHIPPED | OUTPATIENT
Start: 2020-01-15 | End: 2020-03-31

## 2020-01-15 RX ORDER — ACETAMINOPHEN 325 MG/1
650 TABLET ORAL EVERY 4 HOURS PRN
COMMUNITY
Start: 2020-01-15 | End: 2020-03-31

## 2020-01-15 RX ADMIN — AMLODIPINE BESYLATE 2.5 MG: 2.5 TABLET ORAL at 08:13

## 2020-01-15 RX ADMIN — PREDNISONE 60 MG: 20 TABLET ORAL at 08:13

## 2020-01-15 RX ADMIN — IPRATROPIUM BROMIDE AND ALBUTEROL SULFATE 3 ML: .5; 3 SOLUTION RESPIRATORY (INHALATION) at 07:31

## 2020-01-15 RX ADMIN — ATENOLOL 25 MG: 25 TABLET ORAL at 08:13

## 2020-01-15 RX ADMIN — LISINOPRIL 5 MG: 5 TABLET ORAL at 08:13

## 2020-01-15 RX ADMIN — GUAIFENESIN 10 ML: 100 SOLUTION ORAL at 06:48

## 2020-01-15 RX ADMIN — ASPIRIN 81 MG: 81 TABLET, COATED ORAL at 08:13

## 2020-01-15 RX ADMIN — GUAIFENESIN 10 ML: 100 SOLUTION ORAL at 02:19

## 2020-01-15 RX ADMIN — IPRATROPIUM BROMIDE AND ALBUTEROL SULFATE 3 ML: .5; 3 SOLUTION RESPIRATORY (INHALATION) at 11:34

## 2020-01-15 RX ADMIN — LEFLUNOMIDE 10 MG: 10 TABLET ORAL at 08:14

## 2020-01-15 ASSESSMENT — ACTIVITIES OF DAILY LIVING (ADL)
ADLS_ACUITY_SCORE: 14

## 2020-01-15 NOTE — PLAN OF CARE
Pt up ind, continues to have BROOKS and requiring O2. Harsh dry cough-robitussin. Uneventful night, slept well besides coughing.

## 2020-01-15 NOTE — PLAN OF CARE
Pt up ind with walker. A&Ox4. HTN, started on norvasc. LS dim with exp wheeze. BROOKS. Tachypnea in 20s. On 1L NC-non @ baseline. Freq nonpord cough, had robitussin x1, eff. Appetite fair, denies nausea. Amb in halls.

## 2020-01-15 NOTE — PROGRESS NOTES
Discharge Planner   Discharge Plans in progress: Met with pt today. She is feeling better and Independent in her room.   Barriers to discharge plan: No needs at this time from  staff. Pt will not need home care. Pt aware she may still need home o2.   Follow up plan: No concerns. Will sign off but follow should needs arise     Corinne White [facility] [place]  Inpatient Care Coordination   [phone]  [facility]          Entered by: Corinne C. White 01/15/2020 9:31 AM

## 2020-01-15 NOTE — DISCHARGE SUMMARY
[facility]    Discharge Summary  Hospitalist    Date of Admission:  1/11/2020  Date of Discharge:  1/15/2020  Provider:  Gina Mckeon DO    Discharge Diagnoses   1. Acute hypoxic and hypercapnic respiratory failure  2. Chronic COPD with acute exacerbation  3.  Acute RSV bronchitis  4.  Rheumatoid arthritis  5.  Nicotene dependence  6.  Pulmonary nodules    Other medical issues:  Past Medical History:   Diagnosis Date     COPD (chronic obstructive pulmonary disease) (H)      Hypertension      Rheumatoid arthritis (H)        History of Present Illness   Avery Arteaga is an 74 year old female who presented with SOB, cough .  Please see the admission history and physical for full details.    Hospital Course   Aevry Arteaga was admitted on 1/11/2020.  The following problems were addressed during her hospitalization:    1.  Acute hypoxic and hypercapnic respiratory failure  Chronic COPD with acute exacerbation d/t severe RSV bronchitis  Ms. Arteaga is a 73 yo female with a known h/o COPD and continued tobacco use who presents to the hospital with cough, SOB and fever.  CXR was without clear infiltrates; viral respiratory panel positive for RSV.  She was treated with supportive care, supplemental oxygen and IV steroids.  She had slow but gradual improvement in her respiratory status.  She was still requiring supplemental oxygen with activity at time of discharge d/t persistent hypoxia <88%.    I certify that this patient, Avery Arteaga has been under my care and that I, or a nurse practitioner or physician's assistant working with me, had a face-to-face encounter that meets face-to-face encounter requirements with this patient on January 15, 2020.    Avery Arteaga is now in a chronic stable state and continues to require supplemental oxygen due to continued oxygen desaturation.  This patient has been treated in part, or in whole for the following medical condition(s):  COPD J44.9  Treatments tried  and failed or ruled out to treat hypoxemia include nebulizers, IS, ambulation.  If portability is ordered, is the patient mobile within the home? yes    She was given a nebulizer machine and supply kit at time of hospital discharge.  It is anticipated that she will need duonebs at least intermittently for the rest of her lifetime.    2.  Pulmonary nodules  She had a non-contrast CT scan of this chest during this admission which revealed several indeterminate pulmonary nodules.  Admitting hospitalist physician discussed these findings with her son and her.  She will need outpt pulmonary f/u for these nodules and possible PET/CT scan; she was agreeable to this f/u appointment with Pulmonary medicine.      3.  Tobacco abuse  Cessation encouraged at bedside on day of discharge.  She is aware that she cannot smoke with oxygen.      Significant Results and Procedures   none    Pending Results   Unresulted Labs Ordered in the Past 30 Days of this Admission     Date and Time Order Name Status Description    1/11/2020 1241 Blood culture Preliminary     1/11/2020 1230 Blood culture Preliminary           Code Status   DNR / DNI       Primary Care Physician   Althea Rai    Physical Exam   Temp: 96.5  F (35.8  C) Temp src: Oral BP: 127/80 Pulse: 93 Heart Rate: 96 Resp: 28 SpO2: 91 % O2 Device: Nasal cannula with humidification Oxygen Delivery: 1 LPM  Vitals:    01/11/20 1520 01/12/20 0350 01/13/20 0456   Weight: 74.5 kg (164 lb 4.8 oz) 73.7 kg (162 lb 6.4 oz) 74.6 kg (164 lb 6.4 oz)     Vital Signs with Ranges  Temp:  [96.2  F (35.7  C)-97.2  F (36.2  C)] 96.5  F (35.8  C)  Pulse:  [81-93] 93  Heart Rate:  [] 96  Resp:  [20-28] 28  BP: (127-173)/(80-88) 127/80  SpO2:  [91 %-96 %] 91 %  I/O last 3 completed shifts:  In: 240 [P.O.:240]  Out: -     PT SEEN AND EXAMINED ON DAY OF HOSPITAL DISCHARGE  GEN:  Alert, oriented x 3, still miserable with occasional cough and sinus congestion.  HEENT:  Normocephalic/atraumatic,  PERRL, no scleral icterus, no nasal discharge, mouth and membranes moist  NECK:  No clear thyromegaly of clear JVD  CV:  Regular rate and rhythm, no murmur to ausc.  S1 + S2 noted, no S3 or S4.  LUNGS:  Mild, scattered rhonchi that clears with cough. No clear rales or wheezing auscultated bilaterally.  No costal retractions bilaterally.  Symmetric chest rise on inhalation noted.  ABD:  Active bowel sounds, soft, non-tender/non-distended.  No rebound/guarding/rigidity.  No masses palpated.  No obvious HSM to exam.  EXT:  No pitting edema or cyanosis bilaterally. No joint synovitis noted.  No calf-tenderness or asymmetry noted.  SKIN:  Dry to touch, no rashes or jaundice noted.  PSYCH:  Mood appropriate, Not tearful or depressed.  Maintains direct eye contact.  NEURO:  No tremors at rest, somewhat [place] but ambulating without assistance or difficulty.  Speech clear and appropriate.    Discharge Disposition   Discharged to home    Consultations This Hospital Stay   OCCUPATIONAL THERAPY ADULT IP CONSULT  PHYSICAL THERAPY ADULT IP CONSULT  CARE COORDINATOR IP CONSULT  SOCIAL WORK IP CONSULT    Time Spent on this Encounter   Gina NEWTON DO, personally saw the patient today and spent greater than 30 minutes discharging this patient.    Discharge Orders      NEBULIZER     Home care nursing referral      Reason for your hospital stay    You were admitted with a COPD exacerbation due to viral infection.     Follow-up and recommended labs and tests     F/u with PCP within 10-14 days  F/u with rheum within 2 wks (had to reschedule appt for this week)     Activity    Your activity upon discharge: activity as tolerated and no driving until feeling better     MD face to face encounter    Documentation of Face to Face and Certification for Home Health Services    I certify that patient: Avery Arteaga is under my care and that I, or a nurse practitioner or physician's assistant working with me, had a face-to-face  encounter that meets the physician face-to-face encounter requirements with this patient on: 1/15/2020.    This encounter with the patient was in whole, or in part, for the following medical condition, which is the primary reason for home health care: COPD exacerbation; acute hypoxic respiratory failure.    I certify that, based on my findings, the following services are medically necessary home health services: Nursing.    My clinical findings support the need for the above services because: Nurse is needed: To provide assessment and oversight required in the home to assure adherence to the medical plan due to: needed oxygen at time of discharge.    Further, I certify that my clinical findings support that this patient is homebound (i.e. absences from home require considerable and taxing effort and are for medical reasons or [employer] services or infrequently or of short duration when for other reasons) because: Requires assistance of another person or specialized equipment to access medical services because patient: Is unable to walk greater than 100 feet without rest. and Requires supervision of another for safe transfer...    Based on the above findings. I certify that this patient is confined to the home and needs intermittent skilled nursing care, physical therapy and/or speech therapy.  The patient is under my care, and I have initiated the establishment of the plan of care.  This patient will be followed by a physician who will periodically review the plan of care.  Physician/Provider to provide follow up care: Althea Rai    Attending hospital physician (the Medicare certified [place] provider): Gina Mckeon DO  Physician Signature: See electronic signature associated with these discharge orders.  Date: 1/15/2020     DNR/DNI     Oxygen Adult    [facility] [facility] Order 1 liter(s) by nasal cannula with activity with use of portable tank. Expected treatment length is 1 months.. Test on conserving  device as applicable.    Patients who qualify for home O2 coverage under the CMS guidelines require ABG tests or O2 sat readings obtained closest to, but no earlier than 2 days prior to the discharge, as evidence of the need for home oxygen therapy. Testing must be performed while patient is in the chronic stable state. See notes for O2 sats.    I certify that this patient, Avery Arteaga has been under my care and that I, or a nurse practitioner or physician's assistant working with me, had a face-to-face encounter that meets the face-to-face encounter requirements with this patient on 1/15/2020. The patient, Avery Arteaga was evaluated or treated in whole, or in part, for the following medical condition, which necessitates the use of the ordered oxygen. Treatment Diagnosis: COPD and acute viral bronchitis/pneumonia    Attending Provider: Gina Mckeon DO  Physician signature: See electronic signature associated with these discharge orders  Date of Order: January 15, 2020     Diet    Follow this diet upon discharge: resume home diet     Discharge Medications   Current Discharge Medication List      START taking these medications    Details   acetaminophen (TYLENOL) 325 MG tablet Take 2 tablets (650 mg) by mouth every 4 hours as needed for mild pain    Associated Diagnoses: COPD with acute exacerbation (H)      amLODIPine (NORVASC) 2.5 MG tablet Take 1 tablet (2.5 mg) by mouth daily  Qty: 30 tablet, Refills: 0    Associated Diagnoses: Essential hypertension      benzonatate (TESSALON) 100 MG capsule Take 1 capsule (100 mg) by mouth 3 times daily as needed for cough  Qty: 30 capsule, Refills: 0    Associated Diagnoses: Acute respiratory failure with hypoxia (H); Cough; COPD with acute exacerbation (H)      guaiFENesin (MUCINEX) 600 MG 12 hr tablet Take 2 tablets (1,200 mg) by mouth 2 times daily  Qty: 30 tablet, Refills: 0    Associated Diagnoses: Acute respiratory failure with hypoxia (H); Cough; COPD  with acute exacerbation (H)      ipratropium - albuterol 0.5 mg/2.5 mg/3 mL (DUONEB) 0.5-2.5 (3) MG/3ML neb solution Take 1 vial (3 mLs) by nebulization every 6 hours as needed for shortness of breath / dyspnea or wheezing  Qty: 1 Box, Refills: 0    Associated Diagnoses: COPD with acute exacerbation (H)      predniSONE (DELTASONE) 20 MG tablet Take 3 tablets (60 mg) by mouth daily for 2 days, THEN 2 tablets (40 mg) daily for 4 days, THEN 1 tablet (20 mg) daily for 4 days, THEN 0.5 tablets (10 mg) daily for 4 days.  Qty: 20 tablet, Refills: 0    Associated Diagnoses: Acute respiratory failure with hypoxia (H); COPD with acute exacerbation (H)         CONTINUE these medications which have NOT CHANGED    Details   albuterol (PROAIR HFA/PROVENTIL HFA/VENTOLIN HFA) 108 (90 Base) MCG/ACT inhaler Inhale 2 puffs into the lungs every 4 hours as needed    Comments: Pharmacy may dispense brand covered by insurance (Proair, or proventil or ventolin or generic albuterol inhaler)      aspirin 81 MG EC tablet Take 81 mg by mouth daily      atenolol (TENORMIN) 25 MG tablet Take 25 mg by mouth daily      Cholecalciferol (VITAMIN D3) 25 MCG (1000 UT) CAPS Take 2,000 Units by mouth daily      fa-pyridoxine-cyancobalamin 2.5-25-2 MG TABS per tablet Take 1 tablet by mouth daily      Fluticasone-Umeclidin-Vilanterol (TRELEGY ELLIPTA) 100-62.5-25 MCG/INH oral inhaler Inhale 1 puff into the lungs daily      leflunomide (ARAVA) 10 MG tablet Take 10 mg by mouth daily      lisinopril (PRINIVIL/ZESTRIL) 5 MG tablet Take 5 mg by mouth daily      methotrexate 2.5 MG tablet Take 22.5 mg by mouth every 7 days On Mondays      multivitamin  with lutein (OCUVITE WITH LTEIN) CAPS per capsule Take 1 capsule by mouth daily      naproxen sodium (ANAPROX) 220 MG tablet Take 220 mg by mouth 2 times daily as needed for moderate pain or headaches           Allergies   No Known Allergies  Data    Results for SARY CRAWFORD (MRN [id number]) as of 1/16/2020  11:23   Ref. Range 1/11/2020 12:42   Influenza A/B Agn Specimen Unknown Nasopharyngeal   Influenza A Latest Ref Range: NEG^Negative  Negative   Influenza B Latest Ref Range: NEG^Negative  Negative   RSV Rapid Antigen Spec Type Unknown Nasopharyngeal   RSV Rapid Antigen Result Latest Ref Range: NEG^Negative  Positive (A)       Recent Labs   Lab 01/14/20  1148 01/11/20  1242   WBC  --  7.6   HGB  --  12.9   HCT  --  40.7   MCV  --  100    220     Recent Labs   Lab 01/12/20  0728 01/11/20  1242    131*   POTASSIUM 4.3 4.3   CHLORIDE 111* 98   CO2 22 29   ANIONGAP 6 4   * 115*   BUN 13 14   CR 0.75 0.80   GFRESTIMATED 78 72   GFRESTBLACK >90 84   NARESH 8.2* 8.7     Recent Labs   Lab 01/12/20  0728 01/11/20  2133 01/11/20  1242     --  131*   POTASSIUM 4.3  --  4.3   CHLORIDE 111*  --  98   CO2 22  --  29   ANIONGAP 6  --  4   *  --  115*   BUN 13  --  14   CR 0.75  --  0.80   GFRESTIMATED 78  --  72   GFRESTBLACK >90  --  84   NARESH 8.2*  --  8.7   MAG  --  2.2 2.2   PHOS  --  2.2* 2.5   PROTTOTAL  --   --  8.0   ALBUMIN  --   --  3.1*   BILITOTAL  --   --  0.4   ALKPHOS  --   --  87   AST  --   --  31   ALT  --   --  27     Results for orders placed or performed during the hospital encounter of 01/11/20   XR Chest 2 Views    Narrative    CHEST TWO VIEWS 1/11/2020 1:14 PM     HISTORY: Cough, SOB, wheezing, concern for flu like symptoms.   Evaluate for pneumonia.    COMPARISON: None.    FINDINGS: There is a 1.0 cm pulmonary nodule projected over the mid  right lung. No evidence of pneumonia, pneumothorax, or pleural  effusion. Heart size normal.       Impression    IMPRESSION: 1.0 cm solitary pulmonary nodule. Consider CT scan of the  chest for further dilation.     SUE MONTOYA MD   CT Chest w/o Contrast    Narrative    EXAM: CT CHEST W/O CONTRAST  LOCATION: [facility]  DATE/TIME: 1/11/2020 6:20 PM    INDICATION: Lung nodule (Pulmonary nodule)  Acute resp illness, > 40  years old  COMPARISON: None.  TECHNIQUE: CT chest without IV contrast. Multiplanar reformats were obtained. Dose reduction techniques were used.  CONTRAST: None.    FINDINGS:   LUNGS AND PLEURA: Respiratory motion partially obscures the lungs limiting sensitivity, especially nodule characterization. There is a suspicious nodule in the superior segment of the right lower lobe, partially obscured by motion, with subtle spiculated   margins measuring 15 mm x 7 mm x 9 mm. No comparison exam available.    There is diffuse bronchial wall thickening. Numerous tree-in-bud densities cluster the right middle lobe and probably at the bases peripherally. There is severe emphysema. There focal irregular densities in the upper lungs bilaterally anteriorly; this   appears to be an area of scarring in the right upper lobe with associated bronchiolectasis. The lesion in the left upper lung has a more solid appearance and measures 13 x 8 mm without spiculated margins notably. There are a few additional smaller   nodules in the lungs bilaterally.    No pleural effusion or pneumothorax.    MEDIASTINUM/AXILLAE: No thoracic aortic aneurysm. Pulmonary arteries normal in caliber. Mild aortic valve calcification correlate for possible stenosis. No pericardial effusion.    UPPER ABDOMEN: Cholelithiasis.    MUSCULOSKELETAL: Unremarkable.      Impression    IMPRESSION:   1.  Severe emphysema. Mild bronchitis/bronchiolitis also suggested.  2.  Several indeterminate pulmonary nodules, largest and most suspicious in the superior segment of the right lower lobe measures 15 x 7 x 9 mm. No comparison exams available. Recommend follow-up pulmonary consultation. PET/CT may be reasonable next step   in evaluation unless comparison exams are available to document stability.

## 2020-01-15 NOTE — PROGRESS NOTES
Pt seen and examined.  Ok for hospital discharge later today with home oxygen with activity, home RN and close outpt f/u with PCP and Rheumatology.

## 2020-01-15 NOTE — DISCHARGE INSTRUCTIONS
Oxygen Provider:  Arranged through [facility], contact number [phone].  If you have any questions or concerns please call the oxygen company directly.  Your home care referral was sent to [facility] Rn support   If you haven't heard from them within the next 24-48 hours,  Please call them at [phone]

## 2020-01-15 NOTE — PROGRESS NOTES
Received intake call for home oxygen at 11:26AM. Reviewed patient's chart; Patient qualifies under Medicare guidelines and all documentation is in the chart including a good order. Oxygen documentation is updated, but patient also needs a Nebulizer and the notes need to be added and the order needs to be updated.  11:48 AM- Called to offer choice and patient is okay with [facility] setting them up. Discussed equipment with patient and she is okay with starting with a POC, explained we are waiting on documentation to be entered for her nebulizer, but once it is all in we will deliver to bedside, hopefully in the next few hours.    12:03 PM- Spoke with nurse, Arielle, confirmed we received the order, and provided everything we need for oxygen, but we need the order to include description of equipment and length of need.  We also need the notes to discuss the need for a nebulizer.  She understood and was going to let the provider know.  1:25 PM- Nebulizer order completed correctly; however, notes still not entered so called Arielle again to let her know we are waiting on the notes to be completed before we can deliver both oxygen and neb.  She understood and was going to text page the doctor again to let her know.  We told her that we would watch the chart for updates so we can deliver.

## 2020-01-15 NOTE — PLAN OF CARE
Pt to D/C to home.  Pt provided with d/c instructions, including new medications, when medications were last given, and when to take them again.  Pt also informed to f/u with primary in 10-14 days and RA doc in 2 weeks.  Pt verbalized understanding of all d/c and f/u instructions.  All questions were answered at this time.  Copy of paperwork sent with pt.  Medications sent with pt.  Son to provide transport.  All personal belongings sent with pt. No concerns at this time.

## 2020-01-15 NOTE — PROGRESS NOTES
1/15/20  2:08pm- Received call from Arielle, checked the chart and we have notes entered, just reminded her to have the provider sign them asap. But we are able to deliver equipment in the meantime. Explained our staff has 2:30pm appointments so I will try to get the oxygen prior to those appointments, but if not they will be there after 3pm with the equipment. She understood.

## 2020-01-15 NOTE — PROGRESS NOTES
Per chart pt with discharge today.     Physical Therapy Discharge Summary    Reason for therapy discharge:    Discharged to home with home therapy.    Progress towards therapy goal(s). See goals on Care Plan in [facility] electronic health record for goal details.  Goals not met.  Barriers to achieving goals:   discharge from facility.    Therapy recommendation(s):    HHPT was recommended

## 2020-01-15 NOTE — PROGRESS NOTES
Discharge Planner   Discharge Plans in progress: Met with pt. Pt does have orders for skilled home care.. Pt would like to use the in house provider that [facility] uses.  Barriers to discharge plan: none anticiapted  Follow up plan: Will fax order to [facility] P. [phone] fax [phone]    Corinne White [facility]  Inpatient Care Coordination   [phone]  [facility]          Entered by: Corinne C. White 01/15/2020 11:34 AM

## 2020-01-15 NOTE — PLAN OF CARE
Assumed care at 1900. A&Ox4. Up ind with walker. SOB with exertion. LS dim. 1LNC. Nonproductive infrequent cough.

## 2020-01-15 NOTE — PROGRESS NOTES
Patient has been assessed for Home Oxygen needs.  Oxygen readings:   *   RA - at rest  Pulse oximetry SPO2 95 %  *   RA - during activity/with exercise SPO2 86 %  *   O2 at  1 liters/minute (at rest) ...SPO2 90 %  *   O2 at  1 liters/minute (during activity/with exercise) ...SPO2 89 %

## 2020-01-16 NOTE — PROGRESS NOTES
Transition Communication Hand-off for Care Transitions to Next Level of Care Provider    Name: Avery Arteaga  : 1945  MRN #: [id number]  Primary Care Provider: Althea Rai  Primary Care MD Name: Dr. Althea Rai  Primary Clinic: [facility] [address]  [facility] [address]  Primary Care Clinic Name: [facility]  Reason for Hospitalization:  Cough [R05]  Shortness of breath [R06.02]  Wheezing [R06.2]  COPD exacerbation (H) [J44.1]  Acute respiratory failure with hypoxia (H) [J96.01]  Admit Date/Time: 2020 12:13 PM  Discharge Date: 1/15/2020  Payor Source: Payor: [employer] / Plan: [employer] MEDICARE NON [employer] / Product Type: HMO /     Readmission Assessment Measure (DAVID) Risk Score/category: Average           Reason for Communication Hand-off Referral: Admission diagnoses: COPD    Discharge Plan: Home with home care       Concern for non-adherence with plan of care:   Yes, smoking cessation  Discharge Needs Assessment:  Needs      Most Recent Value   Anticipated Changes Related to Illness  none   Equipment Currently Used at Home  none   Interventions provided  COPD Action Plan   Home Care  [facility] [phone], Fax: [phone]          Already enrolled in Tele-monitoring program and name of program:  NA  Follow-up specialty is recommended: Yes    Follow-up plan:  No future appointments.    Any outstanding tests or procedures:    Procedures     Future Labs/Procedures    Oxygen Adult     Comments:    New Home Oxygen Order 1 liter(s) by nasal cannula with activity with use of portable tank. Expected treatment length is 1 months.. Test on conserving device as applicable.    Patients who qualify for home O2 coverage under the CMS guidelines require ABG tests or O2 sat readings obtained closest to, but no earlier than 2 days prior to the discharge, as evidence of the need for home oxygen therapy. Testing must be performed while patient is in the chronic  stable state. See notes for O2 sats.    I certify that this patient, Avery Arteaga has been under my care and that I, or a nurse practitioner or physician's assistant working with me, had a face-to-face encounter that meets the face-to-face encounter requirements with this patient on 1/15/2020. The patient, Avery Arteaga was evaluated or treated in whole, or in part, for the following medical condition, which necessitates the use of the ordered oxygen. Treatment Diagnosis: COPD and acute viral bronchitis/pneumonia    Attending Provider: Gina Mckeon DO  Physician signature: See electronic signature associated with these discharge orders  Date of Order: January 15, 2020          Referrals     Future Labs/Procedures    Home care nursing referral     Comments:    RN skilled nursing visit. RN to assess respiratory and cardiac status.  RN to teach n/a.    Your provider has ordered home care nursing services. If you have not been contacted within 2 days of your discharge please call the inpatient department phone number at [phone] .    PULMONARY MEDICINE REFERRAL     Comments:    Your provider has referred you to: [facility]: Lung Nodule Clinic [facility] [phone]   [email or web address]    Please be aware that coverage of these services is subject to the terms and limitations of your health insurance plan.  Call member services at your health plan with any benefit or coverage questions.      Please bring the following with you to your appointment:    (1) Any X-Rays, CTs or MRIs which have been performed.  Contact the facility where they were done to arrange for  prior to your scheduled appointment.    (2) List of current medications   (3) This referral request   (4) Any documents/labs given to you for this referral          Supplies     Future Labs/Procedures    NEBULIZER     NEBULIZER     Comments:    Nebulizer with supply kit  Length of need anticipated to  be lifelong          [place] Recommendations:  Patient admitted 1/11 with COPD exacerbation & RSV URI. Patient has a history of COPD, Rheumatoid Arthritis on immunosuppressive therapy, HTN, CAD and 30+year smoker. Met with patient at bedside for COPD education and discharge planning. Patient living at [facility]. She does not receive any services.  States she can request additional services prn for a fee. Patient reports having a supportive son, daughter, and facility.  She is independent in medication management and ADL's. Patient ambulates w/o walker or cane.  Patient manages her COPD with daily Trelegy inhaler. Patient rarely uses her rescue inhaler; states she used it once prior to admission. Patient currently smokes 3-4 cigarettes/day. I encouraged smoking cessation. States smoking cessation has been difficult d/t life changes: death of her parents, divorce. She is followed by her PMD, Dr. Althea Rai with [facility]. Patient reports not having a Pulmonologist. States her COPD has been manageable until recently.  Reviewed COPD action plan, information given to patient. Encouraged post hospitalization follow up with PCP and connecting with a pulmonologist. Encouraged having family member accompany her to f/u visit.     Recommendations are for patient to cease smoking especially now that has been discharged on O2 and to follow up with Pulmonary for pulmonary nodules. Patient was discharged home with a nebulizer and O2.   KAYLAH Cardona MA/RN Case Manager  Inpatient Care Coordination  [facility]   [phone]    AVS/Discharge Summary is the source of truth; this is a helpful guide for improved communication of patient story

## 2020-01-17 LAB
BACTERIA SPEC CULT: NO GROWTH
BACTERIA SPEC CULT: NO GROWTH
Lab: NORMAL
Lab: NORMAL
SPECIMEN SOURCE: NORMAL
SPECIMEN SOURCE: NORMAL

## 2020-02-05 DIAGNOSIS — R91.8 OTHER NONSPECIFIC ABNORMAL FINDING OF LUNG FIELD: ICD-10-CM

## 2020-02-05 DIAGNOSIS — R05.9 COUGH: Primary | ICD-10-CM

## 2020-02-11 ENCOUNTER — HOSPITAL ENCOUNTER (OUTPATIENT)
Dept: PET IMAGING | Facility: CLINIC | Age: 75
Discharge: HOME | End: 2020-02-11
Attending: INTERNAL MEDICINE | Admitting: INTERNAL MEDICINE
Payer: COMMERCIAL

## 2020-02-11 DIAGNOSIS — R91.1 PULMONARY NODULE: ICD-10-CM

## 2020-02-11 PROCEDURE — 34300033 ZZH RX 343: Performed by: INTERNAL MEDICINE

## 2020-02-11 PROCEDURE — 25000128 H RX IP 250 OP 636: Performed by: INTERNAL MEDICINE

## 2020-02-11 PROCEDURE — A9552 F18 FDG: HCPCS | Performed by: INTERNAL MEDICINE

## 2020-02-11 PROCEDURE — 78816 PET IMAGE W/CT FULL BODY: CPT | Mod: PI

## 2020-02-11 RX ORDER — IOPAMIDOL 755 MG/ML
10-135 INJECTION, SOLUTION INTRAVASCULAR ONCE
Status: COMPLETED | OUTPATIENT
Start: 2020-02-11 | End: 2020-02-11

## 2020-02-11 RX ADMIN — FLUDEOXYGLUCOSE F-18 11.65 MCI.: 500 INJECTION, SOLUTION INTRAVENOUS at 15:17

## 2020-02-11 RX ADMIN — IOPAMIDOL 99 ML: 755 INJECTION, SOLUTION INTRAVENOUS at 15:18

## 2020-02-26 ENCOUNTER — HOSPITAL ENCOUNTER (OUTPATIENT)
Dept: CARDIOLOGY | Facility: CLINIC | Age: 75
End: 2020-02-26
Attending: INTERNAL MEDICINE
Payer: COMMERCIAL

## 2020-02-26 ENCOUNTER — HOSPITAL ENCOUNTER (OUTPATIENT)
Dept: CARDIOLOGY | Facility: CLINIC | Age: 75
Discharge: HOME | End: 2020-02-26
Attending: INTERNAL MEDICINE | Admitting: INTERNAL MEDICINE
Payer: COMMERCIAL

## 2020-02-26 DIAGNOSIS — R05.9 COUGH: ICD-10-CM

## 2020-02-26 DIAGNOSIS — R91.8 OTHER NONSPECIFIC ABNORMAL FINDING OF LUNG FIELD: ICD-10-CM

## 2020-02-26 DIAGNOSIS — I35.9 AORTIC VALVE CALCIFICATION: ICD-10-CM

## 2020-02-26 PROCEDURE — 93005 ELECTROCARDIOGRAM TRACING: CPT

## 2020-02-26 PROCEDURE — 93306 TTE W/DOPPLER COMPLETE: CPT | Mod: 26 | Performed by: INTERNAL MEDICINE

## 2020-02-26 PROCEDURE — 93306 TTE W/DOPPLER COMPLETE: CPT

## 2020-02-26 PROCEDURE — 93010 ELECTROCARDIOGRAM REPORT: CPT | Performed by: INTERNAL MEDICINE

## 2020-02-27 LAB — INTERPRETATION ECG - MUSE: NORMAL

## 2020-03-12 ENCOUNTER — TRANSFERRED RECORDS (OUTPATIENT)
Dept: HEALTH INFORMATION MANAGEMENT | Facility: CLINIC | Age: 75
End: 2020-03-12

## 2020-03-31 RX ORDER — GABAPENTIN 300 MG/1
300 CAPSULE ORAL AT BEDTIME
COMMUNITY
End: 2023-01-01

## 2020-03-31 NOTE — PHARMACY-ADMISSION MEDICATION HISTORY
Admission medication history interview status for this patient is complete. See [facility] admission navigator for allergy information, prior to admission medications and immunization status.     PTA med list completed by pre-admitting nurse,   Nurse Lana Duncan RN  [place] Mar 31, 2020 12:57 PM      Prior to Admission medications    Medication Sig Last Dose Taking? Auth Provider   albuterol (PROAIR HFA/PROVENTIL HFA/VENTOLIN HFA) 108 (90 Base) MCG/ACT inhaler Inhale 2 puffs into the lungs every 4 hours as needed  Yes Unknown, Entered By History   aspirin 81 MG EC tablet Take 81 mg by mouth daily  Yes Unknown, Entered By History   atenolol (TENORMIN) 25 MG tablet Take 25 mg by mouth daily  Yes Unknown, Entered By History   Cholecalciferol (VITAMIN D3) 25 MCG (1000 UT) CAPS Take 2,000 Units by mouth daily  Yes Unknown, Entered By History   fa-pyridoxine-cyancobalamin 2.5-25-2 MG TABS per tablet Take 1 tablet by mouth daily  Yes Unknown, Entered By History   Fluticasone-Umeclidin-Vilanterol (TRELEGY ELLIPTA) 100-62.5-25 MCG/INH oral inhaler Inhale 1 puff into the lungs daily  Yes Unknown, Entered By History   gabapentin (NEURONTIN) 300 MG capsule Take 300 mg by mouth At Bedtime  Yes Reported, Patient   leflunomide (ARAVA) 10 MG tablet Take 10 mg by mouth daily  Yes Unknown, Entered By History   lisinopril (PRINIVIL/ZESTRIL) 5 MG tablet Take 20 mg by mouth daily   Yes Unknown, Entered By History   methotrexate 2.5 MG tablet Take 22.5 mg by mouth every 7 days On Mondays  Yes Unknown, Entered By History   multivitamin  with lutein (OCUVITE WITH LTEIN) CAPS per capsule Take 1 capsule by mouth daily  Yes Unknown, Entered By History   naproxen sodium (ANAPROX) 220 MG tablet Take 220 mg by mouth 2 times daily as needed for moderate pain or headaches  Yes Unknown, Entered By History   ipratropium - albuterol 0.5 mg/2.5 mg/3 mL (DUONEB) 0.5-2.5 (3) MG/3ML neb solution Take 1 vial (3 mLs) by nebulization every 6 hours as  needed for shortness of breath / dyspnea or wheezing   Gina Mckeon, DO

## 2020-04-07 ENCOUNTER — HOSPITAL ENCOUNTER (INPATIENT)
Facility: CLINIC | Age: 75
LOS: 2 days | Discharge: HOME | DRG: 330 | End: 2020-04-09
Attending: COLON & RECTAL SURGERY | Admitting: COLON & RECTAL SURGERY
Payer: COMMERCIAL

## 2020-04-07 ENCOUNTER — ANESTHESIA EVENT (OUTPATIENT)
Dept: SURGERY | Facility: CLINIC | Age: 75
DRG: 330 | End: 2020-04-07
Payer: COMMERCIAL

## 2020-04-07 ENCOUNTER — ANESTHESIA (OUTPATIENT)
Dept: SURGERY | Facility: CLINIC | Age: 75
DRG: 330 | End: 2020-04-07
Payer: COMMERCIAL

## 2020-04-07 DIAGNOSIS — C18.2 CANCER OF RIGHT COLON (H): Primary | ICD-10-CM

## 2020-04-07 LAB
CREAT SERPL-MCNC: 1.34 MG/DL (ref 0.52–1.04)
ERYTHROCYTE [DISTWIDTH] IN BLOOD BY AUTOMATED COUNT: 17.4 % (ref 10–15)
GFR SERPL CREATININE-BSD FRML MDRD: 39 ML/MIN/{1.73_M2}
HCT VFR BLD AUTO: 40.5 % (ref 35–47)
HGB BLD-MCNC: 12.6 G/DL (ref 11.7–15.7)
MCH RBC QN AUTO: 31.1 PG (ref 26.5–33)
MCHC RBC AUTO-ENTMCNC: 31.1 G/DL (ref 31.5–36.5)
MCV RBC AUTO: 100 FL (ref 78–100)
PLATELET # BLD AUTO: 212 10E9/L (ref 150–450)
PLATELET # BLD AUTO: 274 10E9/L (ref 150–450)
RBC # BLD AUTO: 4.05 10E12/L (ref 3.8–5.2)
WBC # BLD AUTO: 9.5 10E9/L (ref 4–11)

## 2020-04-07 PROCEDURE — 0DTF0ZZ RESECTION OF RIGHT LARGE INTESTINE, OPEN APPROACH: ICD-10-PCS | Performed by: COLON & RECTAL SURGERY

## 2020-04-07 PROCEDURE — 40000306 ZZH STATISTIC PRE PROC ASSESS II: Performed by: COLON & RECTAL SURGERY

## 2020-04-07 PROCEDURE — 85027 COMPLETE CBC AUTOMATED: CPT | Performed by: COLON & RECTAL SURGERY

## 2020-04-07 PROCEDURE — 71000012 ZZH RECOVERY PHASE 1 LEVEL 1 FIRST HR: Performed by: COLON & RECTAL SURGERY

## 2020-04-07 PROCEDURE — 37000008 ZZH ANESTHESIA TECHNICAL FEE, 1ST 30 MIN: Performed by: COLON & RECTAL SURGERY

## 2020-04-07 PROCEDURE — 27210794 ZZH OR GENERAL SUPPLY STERILE: Performed by: COLON & RECTAL SURGERY

## 2020-04-07 PROCEDURE — 25000128 H RX IP 250 OP 636: Performed by: COLON & RECTAL SURGERY

## 2020-04-07 PROCEDURE — 25000128 H RX IP 250 OP 636: Performed by: ANESTHESIOLOGY

## 2020-04-07 PROCEDURE — 71000013 ZZH RECOVERY PHASE 1 LEVEL 1 EA ADDTL HR: Performed by: COLON & RECTAL SURGERY

## 2020-04-07 PROCEDURE — 25000125 ZZHC RX 250: Performed by: NURSE ANESTHETIST, CERTIFIED REGISTERED

## 2020-04-07 PROCEDURE — 37000009 ZZH ANESTHESIA TECHNICAL FEE, EACH ADDTL 15 MIN: Performed by: COLON & RECTAL SURGERY

## 2020-04-07 PROCEDURE — 25800030 ZZH RX IP 258 OP 636: Performed by: ANESTHESIOLOGY

## 2020-04-07 PROCEDURE — 85049 AUTOMATED PLATELET COUNT: CPT | Performed by: COLON & RECTAL SURGERY

## 2020-04-07 PROCEDURE — 25000132 ZZH RX MED GY IP 250 OP 250 PS 637: Performed by: COLON & RECTAL SURGERY

## 2020-04-07 PROCEDURE — 36000093 ZZH SURGERY LEVEL 4 1ST 30 MIN: Performed by: COLON & RECTAL SURGERY

## 2020-04-07 PROCEDURE — 25800030 ZZH RX IP 258 OP 636: Performed by: NURSE ANESTHETIST, CERTIFIED REGISTERED

## 2020-04-07 PROCEDURE — 25000128 H RX IP 250 OP 636: Performed by: NURSE ANESTHETIST, CERTIFIED REGISTERED

## 2020-04-07 PROCEDURE — 88309 TISSUE EXAM BY PATHOLOGIST: CPT | Performed by: COLON & RECTAL SURGERY

## 2020-04-07 PROCEDURE — 36000063 ZZH SURGERY LEVEL 4 EA 15 ADDTL MIN: Performed by: COLON & RECTAL SURGERY

## 2020-04-07 PROCEDURE — 88309 TISSUE EXAM BY PATHOLOGIST: CPT | Mod: 26 | Performed by: COLON & RECTAL SURGERY

## 2020-04-07 PROCEDURE — 12000000 ZZH R&B MED SURG/OB

## 2020-04-07 PROCEDURE — 25000125 ZZHC RX 250: Performed by: COLON & RECTAL SURGERY

## 2020-04-07 PROCEDURE — 82565 ASSAY OF CREATININE: CPT | Performed by: COLON & RECTAL SURGERY

## 2020-04-07 PROCEDURE — 27211024 ZZHC OR SUPPLY OTHER OPNP: Performed by: COLON & RECTAL SURGERY

## 2020-04-07 PROCEDURE — 36415 COLL VENOUS BLD VENIPUNCTURE: CPT | Performed by: COLON & RECTAL SURGERY

## 2020-04-07 RX ORDER — ACETAMINOPHEN 325 MG/1
975 TABLET ORAL ONCE
Status: COMPLETED | OUTPATIENT
Start: 2020-04-07 | End: 2020-04-07

## 2020-04-07 RX ORDER — LIDOCAINE 40 MG/G
CREAM TOPICAL
Status: DISCONTINUED | OUTPATIENT
Start: 2020-04-07 | End: 2020-04-07 | Stop reason: HOSPADM

## 2020-04-07 RX ORDER — ACETAMINOPHEN 325 MG/1
975 TABLET ORAL EVERY 8 HOURS
Status: DISCONTINUED | OUTPATIENT
Start: 2020-04-07 | End: 2020-04-09 | Stop reason: HOSPADM

## 2020-04-07 RX ORDER — GLYCOPYRROLATE 0.2 MG/ML
INJECTION, SOLUTION INTRAMUSCULAR; INTRAVENOUS PRN
Status: DISCONTINUED | OUTPATIENT
Start: 2020-04-07 | End: 2020-04-07

## 2020-04-07 RX ORDER — ALVIMOPAN 12 MG/1
12 CAPSULE ORAL ONCE
Status: COMPLETED | OUTPATIENT
Start: 2020-04-07 | End: 2020-04-07

## 2020-04-07 RX ORDER — LIDOCAINE HYDROCHLORIDE 10 MG/ML
INJECTION, SOLUTION INFILTRATION; PERINEURAL PRN
Status: DISCONTINUED | OUTPATIENT
Start: 2020-04-07 | End: 2020-04-07

## 2020-04-07 RX ORDER — LIDOCAINE 40 MG/G
CREAM TOPICAL
Status: DISCONTINUED | OUTPATIENT
Start: 2020-04-07 | End: 2020-04-09 | Stop reason: HOSPADM

## 2020-04-07 RX ORDER — ONDANSETRON 4 MG/1
4 TABLET, ORALLY DISINTEGRATING ORAL EVERY 30 MIN PRN
Status: DISCONTINUED | OUTPATIENT
Start: 2020-04-07 | End: 2020-04-07 | Stop reason: HOSPADM

## 2020-04-07 RX ORDER — NALOXONE HYDROCHLORIDE 0.4 MG/ML
.1-.4 INJECTION, SOLUTION INTRAMUSCULAR; INTRAVENOUS; SUBCUTANEOUS
Status: ACTIVE | OUTPATIENT
Start: 2020-04-07 | End: 2020-04-08

## 2020-04-07 RX ORDER — FENTANYL CITRATE 50 UG/ML
INJECTION, SOLUTION INTRAMUSCULAR; INTRAVENOUS PRN
Status: DISCONTINUED | OUTPATIENT
Start: 2020-04-07 | End: 2020-04-07

## 2020-04-07 RX ORDER — HYDROMORPHONE HYDROCHLORIDE 1 MG/ML
.3-.5 INJECTION, SOLUTION INTRAMUSCULAR; INTRAVENOUS; SUBCUTANEOUS EVERY 5 MIN PRN
Status: DISCONTINUED | OUTPATIENT
Start: 2020-04-07 | End: 2020-04-07 | Stop reason: HOSPADM

## 2020-04-07 RX ORDER — GABAPENTIN 300 MG/1
300 CAPSULE ORAL AT BEDTIME
Status: DISCONTINUED | OUTPATIENT
Start: 2020-04-07 | End: 2020-04-09 | Stop reason: HOSPADM

## 2020-04-07 RX ORDER — SODIUM CHLORIDE, SODIUM LACTATE, POTASSIUM CHLORIDE, CALCIUM CHLORIDE 600; 310; 30; 20 MG/100ML; MG/100ML; MG/100ML; MG/100ML
INJECTION, SOLUTION INTRAVENOUS CONTINUOUS
Status: DISCONTINUED | OUTPATIENT
Start: 2020-04-07 | End: 2020-04-07 | Stop reason: HOSPADM

## 2020-04-07 RX ORDER — HYDROMORPHONE HYDROCHLORIDE 1 MG/ML
.3-.5 INJECTION, SOLUTION INTRAMUSCULAR; INTRAVENOUS; SUBCUTANEOUS
Status: DISCONTINUED | OUTPATIENT
Start: 2020-04-07 | End: 2020-04-09 | Stop reason: HOSPADM

## 2020-04-07 RX ORDER — KETAMINE HYDROCHLORIDE 10 MG/ML
INJECTION INTRAMUSCULAR; INTRAVENOUS PRN
Status: DISCONTINUED | OUTPATIENT
Start: 2020-04-07 | End: 2020-04-07

## 2020-04-07 RX ORDER — CIPROFLOXACIN 2 MG/ML
400 INJECTION, SOLUTION INTRAVENOUS
Status: DISCONTINUED | OUTPATIENT
Start: 2020-04-07 | End: 2020-04-07 | Stop reason: HOSPADM

## 2020-04-07 RX ORDER — ONDANSETRON 2 MG/ML
INJECTION INTRAMUSCULAR; INTRAVENOUS PRN
Status: DISCONTINUED | OUTPATIENT
Start: 2020-04-07 | End: 2020-04-07

## 2020-04-07 RX ORDER — ALVIMOPAN 12 MG/1
12 CAPSULE ORAL 2 TIMES DAILY
Status: DISCONTINUED | OUTPATIENT
Start: 2020-04-08 | End: 2020-04-09 | Stop reason: HOSPADM

## 2020-04-07 RX ORDER — HEPARIN SODIUM 5000 [USP'U]/.5ML
5000 INJECTION, SOLUTION INTRAVENOUS; SUBCUTANEOUS
Status: COMPLETED | OUTPATIENT
Start: 2020-04-07 | End: 2020-04-07

## 2020-04-07 RX ORDER — BUPIVACAINE HYDROCHLORIDE AND EPINEPHRINE 2.5; 5 MG/ML; UG/ML
INJECTION, SOLUTION EPIDURAL; INFILTRATION; INTRACAUDAL; PERINEURAL PRN
Status: DISCONTINUED | OUTPATIENT
Start: 2020-04-07 | End: 2020-04-07 | Stop reason: HOSPADM

## 2020-04-07 RX ORDER — PROPOFOL 10 MG/ML
INJECTION, EMULSION INTRAVENOUS PRN
Status: DISCONTINUED | OUTPATIENT
Start: 2020-04-07 | End: 2020-04-07

## 2020-04-07 RX ORDER — LEFLUNOMIDE 10 MG/1
10 TABLET ORAL DAILY
Status: DISCONTINUED | OUTPATIENT
Start: 2020-04-08 | End: 2020-04-09 | Stop reason: HOSPADM

## 2020-04-07 RX ORDER — ALBUTEROL SULFATE 90 UG/1
2 AEROSOL, METERED RESPIRATORY (INHALATION) EVERY 4 HOURS PRN
Status: DISCONTINUED | OUTPATIENT
Start: 2020-04-07 | End: 2020-04-09 | Stop reason: HOSPADM

## 2020-04-07 RX ORDER — NALOXONE HYDROCHLORIDE 0.4 MG/ML
.1-.4 INJECTION, SOLUTION INTRAMUSCULAR; INTRAVENOUS; SUBCUTANEOUS
Status: DISCONTINUED | OUTPATIENT
Start: 2020-04-07 | End: 2020-04-09 | Stop reason: HOSPADM

## 2020-04-07 RX ORDER — ONDANSETRON 2 MG/ML
4 INJECTION INTRAMUSCULAR; INTRAVENOUS EVERY 6 HOURS PRN
Status: DISCONTINUED | OUTPATIENT
Start: 2020-04-07 | End: 2020-04-09 | Stop reason: HOSPADM

## 2020-04-07 RX ORDER — SODIUM CHLORIDE, SODIUM LACTATE, POTASSIUM CHLORIDE, CALCIUM CHLORIDE 600; 310; 30; 20 MG/100ML; MG/100ML; MG/100ML; MG/100ML
INJECTION, SOLUTION INTRAVENOUS CONTINUOUS
Status: DISCONTINUED | OUTPATIENT
Start: 2020-04-07 | End: 2020-04-09 | Stop reason: HOSPADM

## 2020-04-07 RX ORDER — DIMENHYDRINATE 50 MG/ML
25 INJECTION, SOLUTION INTRAMUSCULAR; INTRAVENOUS
Status: DISCONTINUED | OUTPATIENT
Start: 2020-04-07 | End: 2020-04-07 | Stop reason: HOSPADM

## 2020-04-07 RX ORDER — ONDANSETRON 2 MG/ML
4 INJECTION INTRAMUSCULAR; INTRAVENOUS EVERY 30 MIN PRN
Status: DISCONTINUED | OUTPATIENT
Start: 2020-04-07 | End: 2020-04-07 | Stop reason: HOSPADM

## 2020-04-07 RX ORDER — HYDRALAZINE HYDROCHLORIDE 20 MG/ML
2.5-5 INJECTION INTRAMUSCULAR; INTRAVENOUS EVERY 10 MIN PRN
Status: DISCONTINUED | OUTPATIENT
Start: 2020-04-07 | End: 2020-04-07 | Stop reason: HOSPADM

## 2020-04-07 RX ORDER — FENTANYL CITRATE 50 UG/ML
25-50 INJECTION, SOLUTION INTRAMUSCULAR; INTRAVENOUS
Status: DISCONTINUED | OUTPATIENT
Start: 2020-04-07 | End: 2020-04-07 | Stop reason: HOSPADM

## 2020-04-07 RX ORDER — CIPROFLOXACIN 2 MG/ML
400 INJECTION, SOLUTION INTRAVENOUS EVERY 12 HOURS
Status: COMPLETED | OUTPATIENT
Start: 2020-04-08 | End: 2020-04-08

## 2020-04-07 RX ORDER — DEXAMETHASONE SODIUM PHOSPHATE 4 MG/ML
INJECTION, SOLUTION INTRA-ARTICULAR; INTRALESIONAL; INTRAMUSCULAR; INTRAVENOUS; SOFT TISSUE PRN
Status: DISCONTINUED | OUTPATIENT
Start: 2020-04-07 | End: 2020-04-07

## 2020-04-07 RX ORDER — ATENOLOL 25 MG/1
25 TABLET ORAL DAILY
Status: DISCONTINUED | OUTPATIENT
Start: 2020-04-08 | End: 2020-04-09 | Stop reason: HOSPADM

## 2020-04-07 RX ORDER — CIPROFLOXACIN 2 MG/ML
400 INJECTION, SOLUTION INTRAVENOUS SEE ADMIN INSTRUCTIONS
Status: DISCONTINUED | OUTPATIENT
Start: 2020-04-07 | End: 2020-04-07 | Stop reason: HOSPADM

## 2020-04-07 RX ORDER — LABETALOL 20 MG/4 ML (5 MG/ML) INTRAVENOUS SYRINGE
10
Status: DISCONTINUED | OUTPATIENT
Start: 2020-04-07 | End: 2020-04-07 | Stop reason: HOSPADM

## 2020-04-07 RX ORDER — LISINOPRIL 20 MG/1
20 TABLET ORAL DAILY
Status: DISCONTINUED | OUTPATIENT
Start: 2020-04-08 | End: 2020-04-07

## 2020-04-07 RX ORDER — NEOSTIGMINE METHYLSULFATE 1 MG/ML
VIAL (ML) INJECTION PRN
Status: DISCONTINUED | OUTPATIENT
Start: 2020-04-07 | End: 2020-04-07

## 2020-04-07 RX ADMIN — PHENYLEPHRINE HYDROCHLORIDE 150 MCG: 10 INJECTION INTRAVENOUS at 13:33

## 2020-04-07 RX ADMIN — PHENYLEPHRINE HYDROCHLORIDE 150 MCG: 10 INJECTION INTRAVENOUS at 12:10

## 2020-04-07 RX ADMIN — PHENYLEPHRINE HYDROCHLORIDE 150 MCG: 10 INJECTION INTRAVENOUS at 12:22

## 2020-04-07 RX ADMIN — GLYCOPYRROLATE 0.8 MG: 0.2 INJECTION, SOLUTION INTRAMUSCULAR; INTRAVENOUS at 13:49

## 2020-04-07 RX ADMIN — METRONIDAZOLE 500 MG: 500 INJECTION, SOLUTION INTRAVENOUS at 12:03

## 2020-04-07 RX ADMIN — FENTANYL CITRATE 50 MCG: 50 INJECTION INTRAMUSCULAR; INTRAVENOUS at 15:36

## 2020-04-07 RX ADMIN — FENTANYL CITRATE 50 MCG: 50 INJECTION, SOLUTION INTRAMUSCULAR; INTRAVENOUS at 12:39

## 2020-04-07 RX ADMIN — METRONIDAZOLE 500 MG: 500 INJECTION, SOLUTION INTRAVENOUS at 21:42

## 2020-04-07 RX ADMIN — ROCURONIUM BROMIDE 5 MG: 10 INJECTION INTRAVENOUS at 12:58

## 2020-04-07 RX ADMIN — ACETAMINOPHEN 975 MG: 325 TABLET, FILM COATED ORAL at 18:23

## 2020-04-07 RX ADMIN — ACETAMINOPHEN 975 MG: 325 TABLET, FILM COATED ORAL at 10:53

## 2020-04-07 RX ADMIN — PROPOFOL 150 MG: 10 INJECTION, EMULSION INTRAVENOUS at 12:07

## 2020-04-07 RX ADMIN — FENTANYL CITRATE 50 MCG: 50 INJECTION, SOLUTION INTRAMUSCULAR; INTRAVENOUS at 13:42

## 2020-04-07 RX ADMIN — FENTANYL CITRATE 50 MCG: 50 INJECTION, SOLUTION INTRAMUSCULAR; INTRAVENOUS at 12:45

## 2020-04-07 RX ADMIN — CIPROFLOXACIN 400 MG: 2 INJECTION INTRAVENOUS at 12:23

## 2020-04-07 RX ADMIN — Medication 5 MG: at 13:49

## 2020-04-07 RX ADMIN — PHENYLEPHRINE HYDROCHLORIDE 150 MCG: 10 INJECTION INTRAVENOUS at 12:30

## 2020-04-07 RX ADMIN — SODIUM CHLORIDE, POTASSIUM CHLORIDE, SODIUM LACTATE AND CALCIUM CHLORIDE: 600; 310; 30; 20 INJECTION, SOLUTION INTRAVENOUS at 12:03

## 2020-04-07 RX ADMIN — ONDANSETRON HYDROCHLORIDE 4 MG: 2 INJECTION, SOLUTION INTRAVENOUS at 12:07

## 2020-04-07 RX ADMIN — ALVIMOPAN 12 MG: 12 CAPSULE ORAL at 10:47

## 2020-04-07 RX ADMIN — GLYCOPYRROLATE 0.2 MG: 0.2 INJECTION, SOLUTION INTRAMUSCULAR; INTRAVENOUS at 12:07

## 2020-04-07 RX ADMIN — FENTANYL CITRATE 25 MCG: 50 INJECTION INTRAMUSCULAR; INTRAVENOUS at 15:21

## 2020-04-07 RX ADMIN — ROCURONIUM BROMIDE 35 MG: 10 INJECTION INTRAVENOUS at 12:07

## 2020-04-07 RX ADMIN — LIDOCAINE HYDROCHLORIDE 50 MG: 10 INJECTION, SOLUTION INFILTRATION; PERINEURAL at 12:07

## 2020-04-07 RX ADMIN — DEXAMETHASONE SODIUM PHOSPHATE 4 MG: 4 INJECTION, SOLUTION INTRA-ARTICULAR; INTRALESIONAL; INTRAMUSCULAR; INTRAVENOUS; SOFT TISSUE at 12:07

## 2020-04-07 RX ADMIN — GABAPENTIN 300 MG: 300 CAPSULE ORAL at 21:47

## 2020-04-07 RX ADMIN — SODIUM CHLORIDE, POTASSIUM CHLORIDE, SODIUM LACTATE AND CALCIUM CHLORIDE: 600; 310; 30; 20 INJECTION, SOLUTION INTRAVENOUS at 14:36

## 2020-04-07 RX ADMIN — Medication 50 MG: at 12:20

## 2020-04-07 RX ADMIN — HEPARIN SODIUM 5000 UNITS: 10000 INJECTION, SOLUTION INTRAVENOUS; SUBCUTANEOUS at 11:36

## 2020-04-07 RX ADMIN — FENTANYL CITRATE 100 MCG: 50 INJECTION, SOLUTION INTRAMUSCULAR; INTRAVENOUS at 12:06

## 2020-04-07 ASSESSMENT — COPD QUESTIONNAIRES
COPD: 1
CAT_SEVERITY: MILD

## 2020-04-07 ASSESSMENT — ENCOUNTER SYMPTOMS
SEIZURES: 0
DYSRHYTHMIAS: 0
STRIDOR: 0

## 2020-04-07 ASSESSMENT — ACTIVITIES OF DAILY LIVING (ADL)
ADLS_ACUITY_SCORE: 14
ADLS_ACUITY_SCORE: 14

## 2020-04-07 ASSESSMENT — LIFESTYLE VARIABLES: TOBACCO_USE: 1

## 2020-04-07 ASSESSMENT — MIFFLIN-ST. JEOR: SCORE: 1238.42

## 2020-04-07 NOTE — OP NOTE
Procedure Date: 04/07/2020      PREOPERATIVE DIAGNOSIS:  Right colon cancer.      POSTOPERATIVE DIAGNOSIS:  Right colon cancer.      PROCEDURE:  Laparoscopic right hemicolectomy.      SURGEON:  Skye Mays MD.      ASSISTANT:  Dr. Denise Gibson and Dr. Chidi Padilla, [facility] Colorectal Surgery fellow.      ANESTHESIA:  General endotracheal anesthesia plus a laparoscopic performed TAP block using 30 mL of 0.25% Marcaine with 1:200,000 epinephrine.      INDICATIONS:  Avery is a 75-year-old woman who had a pneumonia.  She had x-ray showing some lung nodules which then prompted a CT scan.  At this point, on the PET CT scan there was noted to be a right colon lesion.  She had a colonoscopy which confirmed a right colon cancer.  She had biopsy of the pulmonary lesions that showed granuloma.  Given that she had a right colon cancer, recommended a right hemicolectomy.  We discussed the risks, benefits and alternatives, risks including bleeding, infection, both superficial and deep, possible anastomotic leak that might require reoperation or stoma, and other risks associated with major abdominal surgery and general anesthesia including, but not limited to DVT, PE, heart attack, stroke, urinary tract infection, other cardiopulmonary events and even death.  We discussed the possibility of delaying surgery with the COVID restriction, but given the fact that her diagnosis was almost 2 months ago already, I elected to proceed as opposed to wait for an additional unknown amount of time with concerns of progression of her disease.  She understood and wished to proceed.      FINDINGS:  There was tattooing in the mid portion of the ascending colon and a redundant hepatic flexure.  No other intraperitoneal disease was noted.      DESCRIPTION OF PROCEDURE:  After informed consent was obtained, the patient was taken to the operating room, positioned on the operating table in supine position and was intubated without  difficulty.  Marmolejo catheter and orogastric tube were placed.      The abdomen was then prepped and draped in the usual fashion.  After appropriate timeout, a 1 cm incision was made above the umbilicus.  Dissection was carried down through the fascia.  Peritoneal cavity was entered without difficulty.  Two 5 mm ports were placed in the right lateral abdomen and a TAP block was performed injecting 7.5 mL of 0.25% Marcaine into each of the 4 quadrants in the pretransversalis plane.  Inspection of the liver and peritoneal surfaces did not reveal any evidence of metastatic disease.  She was then positioned in a little bit of Trendelenburg with the right side up and a window was made inferior to the vascular pedicle and mesentery was elevated up off the retroperitoneum, sweeping the duodenum medially.  Once we had freed this up, the vascular pedicle was divided using numerous fires of the LigaSure.  Mesentery was then elevated further up underneath the transverse colon and out laterally.  This was somewhat tedious given the redundant flexure.      Once we had developed the medial plane as far lateral as made sense, we went laterally, incising the attachments of the small bowel that were adherent to the ovary and then elevated the small bowel and right colon up off of the retroperitoneum, joining our medial plane.  The flexure was a bit tedious as there was kind of a knuckling and some adhesions of the omentum over this area, but we were able to free this up and bring the transverse colon down.  Once we had mobilized the colon over to the middle colic, we grasped the appendix and opened a roughly 3 cm periumbilical incision extending through the umbilicus.  The 5-9 Codey was placed and the specimen was easily brought out.  We then lined up a side-to-side functional end-to-end anastomosis, dividing the remaining mesentery using the LigaSure.  The common channel was created with 75 blue load and the staple lines were  offset, and the common enterotomy was closed with a TA-90.  Corners, crotch and crossing staple line were oversewn and the inspection of the anastomosis revealed a widely patent, well perfused, tension free and hemostatic anastomosis.  This was dropped back into the abdomen.  We irrigated with several liters of warm saline and brought the omentum over the anastomosis.  The two 5 mm ports were removed under direct vision.  There was no bleeding.  The fascia was reapproximated using 0 looped PDS and the skin edges were reapproximated with 4-0 Monocryl and Dermabond.      The patient was awakened from anesthesia and taken to the recovery area in stable condition.  Sponge and needle counts were correct at the end the case.      ESTIMATED BLOOD LOSS:  Less than 10 mL.      Specimen was opened on the back field revealing an ulcerated mass in the ascending colon at the area of the tattooing.  No other unexpected findings.         ADRIANA FERMIN MD             D: 2020   T: 2020   MT: PRAVIN      Name:     SARY CRAWFORD   MRN:      [id number]        Account:        [id number]   :      1945           Procedure Date: 2020      Document: [id number]       cc: Althea Lucas MD

## 2020-04-07 NOTE — ANESTHESIA POSTPROCEDURE EVALUATION
Patient: Avery Arteaga    Procedure(s):  laparoscopic assisted right houston colectomy    Diagnosis:Malignant neoplasm of right colon (H) [C18.2]  Diagnosis Additional Information: No value filed.    Anesthesia Type:  General    Note:  Anesthesia Post Evaluation    Patient location during evaluation: PACU  Patient participation: Able to fully participate in evaluation  Level of consciousness: awake  Pain management: adequate  Airway patency: patent  Cardiovascular status: acceptable  Respiratory status: acceptable  Hydration status: acceptable  PONV: controlled     Anesthetic complications: None          Last vitals:  Vitals:    04/07/20 1415 04/07/20 1420 04/07/20 1430   BP: (!) 141/78 (!) 140/73 120/75   Pulse: 76 76 75   Resp: 17 (!) 71 29   Temp:      SpO2: 100% 100% 100%         Electronically Signed By: Roddy Bonilla MD  April 7, 2020  2:35 PM

## 2020-04-07 NOTE — BRIEF OP NOTE
[facility]    Brief Operative Note    Pre-operative diagnosis: Malignant neoplasm of right colon (H) [C18.2]  Post-operative diagnosis Same as pre-operative diagnosis    Procedure: Procedure(s):  laparoscopic assisted right houston colectomy  Surgeon: Surgeon(s) and Role:     * Skye Mays MD - Primary     * Denise Gibson MD - Assisting     * Chidi Padilla MD - Fellow - Assisting  Anesthesia: General   Estimated blood loss: 5mL  Drains: None  Specimens:   ID Type Source Tests Collected by Time Destination   A : right colon portion Tissue Large Intestine, Right/Ascending SURGICAL PATHOLOGY EXAM Skye Mays MD 4/7/2020  1:32 PM      Findings:   Redundant heptic flexure with adhesion between omentum and ascending colon. Tattoo in ascending colon. No intraperitoneal disease. Stapled 75mm MCKAY blue side to side functional end to end..  Complications: None apparent.  Implants: * No implants in log *    Condition on discharge from OR: Satisfactory    Chidi Padilla MD   [facility]   [phone].        ADDENDUM:    PATIENT DATA  Indicate Y or N:  Home O2 No  Hemodialysis  No  Transplant patient  No  Cirrhosis  No  Steroids in last 30 days  No  Immunomodulators in last 30 days  Yes  Anticoagulation at time of surgery  No   List medication n/a  Prior abdominal surgery  No  Pelvic irradiation  No    Albumin within 30 days if known n/a   Hgb within 30 days if known  Hemoglobin   Date Value Ref Range Status   04/07/2020 12.6 11.7 - 15.7 g/dL Final   ]  Cr within 30 days if known    Creatinine   Date Value Ref Range Status   01/12/2020 0.75 0.52 - 1.04 mg/dL Final   ]  Body mass index is 27.24 kg/m .      OR DATA  Emergent  No   <24 hours  No   <1 week  No  Bowel Prep Yes  Antibiotics  Yes  DVT prophylaxis    Heparin  Yes   SCD  Yes   None  No  Drain  No  ASA (1,2,3,4) 3  OR time (min) 87  Stents  No  Transfuse >/= 2U  No  Anastomosis   Stapled   Yes   Handsewn  No  Leak Test    Positive  No   Negative  No   Not done  Yes    FOR CANCER ALSO COMPLETE:  Preoperative treatment (Y or N)   Chemo  No   Radiation No   Diversion  No  Preoperative Stage   CT  Yes   MRI No   US  No   Clinical  No   Unknown  No   T stage (1,2,3,4) 5   N stage (0,1,2) 3   M stage (0,1) 0  CEA ?  Metastatic disease at time of operation  No

## 2020-04-07 NOTE — ANESTHESIA PREPROCEDURE EVALUATION
Anesthesia Pre-Procedure Evaluation    Patient: Avery Arteaga   MRN: [id number] : 1945          Preoperative Diagnosis: Malignant neoplasm of right colon (H) [C18.2]    Procedure(s):  laparoscopic assisted right houston colectomy    Past Medical History:   Diagnosis Date     COPD (chronic obstructive pulmonary disease) (H)      Hypertension      Rheumatoid arthritis (H)      Past Surgical History:   Procedure Laterality Date     ------------OTHER------------- Right     Right middle finger RA nodule removed     brochoscopy      x 2     COLONOSCOPY       Anesthesia Evaluation     . Pt has had prior anesthetic. Type: General    No history of anesthetic complications          ROS/MED HX    ENT/Pulmonary:  - neg pulmonary ROS   (+)tobacco use, Past use mild COPD, , . Other pulmonary disease lung nodules.   (-) asthma and recent URI   Neurologic:     (+)other neuro RLS   (-) seizures and CVA   Cardiovascular:     (+) hypertension----. : . . . :. . Previous cardiac testing Echodate:results:The left ventricle is normal in size. There is borderline concentric left  ventricular hypertrophy. Left ventricular systolic function is low normal. The  visual ejection fraction is estimated at 50-55%. Left ventricular diastolic  function is normal. No regional wall motion abnormalities noted.  The right ventricle is normal size. The right ventricular systolic function is  normal.  Trace mitral and tricuspid regurgitation.  No pericardial effusion.  No previous study for comparison.date: results:ECG reviewed date: results:NSR.  PACs. date: results:         (-) CAD, arrhythmias and valvular problems/murmurs   METS/Exercise Tolerance:     Hematologic: Comments: Lab Test        20                       1038          1148          1242          WBC          9.5           --          7.6           HGB          12.6          --          12.9          MCV          100           --          100            PLT          274          243          220            Lab Test        01/12/20 01/11/20                       0728          1242          NA           139          131*          POTASSIUM    4.3          4.3           CHLORIDE     111*         98            CO2          22           29            BUN          13           14            CR           0.75         0.80          ANIONGAP     6            4             NARESH          8.2*         8.7           GLC          137*         115*         - neg hematologic  ROS       Musculoskeletal:   (+) arthritis,  other musculoskeletal- rheumatoid      GI/Hepatic:  - neg GI/hepatic ROS      (-) GERD   Renal/Genitourinary:  - ROS Renal section negative       Endo:     (+) Obesity, .   (-) Type I DM, Type II DM, thyroid disease and chronic steroid usage   Psychiatric:  - neg psychiatric ROS       Infectious Disease:  - neg infectious disease ROS       Malignancy:   (+) Malignancy History of GI          Other:    - neg other ROS                      Physical Exam  Normal systems: cardiovascular and pulmonary    Airway   Mallampati: I  TM distance: >3 FB  Neck ROM: full    Dental   (+) upper dentures and lower dentures    Cardiovascular   Rhythm and rate: regular and normal  (-) no friction rub, no systolic click and no murmur    Pulmonary    breath sounds clear to auscultation(-) no rhonchi, no decreased breath sounds, no wheezes, no rales and no stridor            Lab Results   Component Value Date    WBC 7.6 01/11/2020    HGB 12.9 01/11/2020    HCT 40.7 01/11/2020     01/14/2020     01/12/2020    POTASSIUM 4.3 01/12/2020    CHLORIDE 111 (H) 01/12/2020    CO2 22 01/12/2020    BUN 13 01/12/2020    CR 0.75 01/12/2020     (H) 01/12/2020    NARESH 8.2 (L) 01/12/2020    PHOS 2.2 (L) 01/11/2020    MAG 2.2 01/11/2020    ALBUMIN 3.1 (L) 01/11/2020    PROTTOTAL 8.0 01/11/2020    ALT 27 01/11/2020    AST 31 01/11/2020    ALKPHOS 87 01/11/2020    BILITOTAL 0.4  "01/11/2020       Preop Vitals  BP Readings from Last 3 Encounters:   01/15/20 127/80    Pulse Readings from Last 3 Encounters:   01/14/20 93      Resp Readings from Last 3 Encounters:   01/15/20 20    SpO2 Readings from Last 3 Encounters:   01/15/20 92%      Temp Readings from Last 1 Encounters:   01/15/20 96.5  F (35.8  C) (Oral)    Ht Readings from Last 1 Encounters:   04/07/20 1.651 m (5' 5\")      Wt Readings from Last 1 Encounters:   04/07/20 74.3 kg (163 lb 11.2 oz)    Estimated body mass index is 27.24 kg/m  as calculated from the following:    Height as of this encounter: 1.651 m (5' 5\").    Weight as of this encounter: 74.3 kg (163 lb 11.2 oz).       Anesthesia Plan      History & Physical Review  History and physical reviewed and following examination; no interval change.    ASA Status:  3 .    NPO Status:  > 8 hours    Plan for General with Intravenous induction.   PONV prophylaxis:  Ondansetron (or other 5HT-3) and Dexamethasone or Solumedrol  Patient is DNR.  She would like pharmacologic resuscitation, but nor chest compressions or defibrillation.        Postoperative Care  Postoperative pain management:  IV analgesics.      Consents  Anesthetic plan, risks, benefits and alternatives discussed with:  Patient or representative and Patient..                 Roddy Bonilla MD                    .  "

## 2020-04-08 LAB
ANION GAP SERPL CALCULATED.3IONS-SCNC: 3 MMOL/L (ref 3–14)
BUN SERPL-MCNC: 20 MG/DL (ref 7–30)
CALCIUM SERPL-MCNC: 8.4 MG/DL (ref 8.5–10.1)
CHLORIDE SERPL-SCNC: 110 MMOL/L (ref 94–109)
CO2 SERPL-SCNC: 25 MMOL/L (ref 20–32)
CREAT SERPL-MCNC: 1.2 MG/DL (ref 0.52–1.04)
ERYTHROCYTE [DISTWIDTH] IN BLOOD BY AUTOMATED COUNT: 17.2 % (ref 10–15)
GFR SERPL CREATININE-BSD FRML MDRD: 44 ML/MIN/{1.73_M2}
GLUCOSE SERPL-MCNC: 118 MG/DL (ref 70–99)
HCT VFR BLD AUTO: 35.8 % (ref 35–47)
HGB BLD-MCNC: 11.1 G/DL (ref 11.7–15.7)
MCH RBC QN AUTO: 31.6 PG (ref 26.5–33)
MCHC RBC AUTO-ENTMCNC: 31 G/DL (ref 31.5–36.5)
MCV RBC AUTO: 102 FL (ref 78–100)
PLATELET # BLD AUTO: 212 10E9/L (ref 150–450)
POTASSIUM SERPL-SCNC: 4.8 MMOL/L (ref 3.4–5.3)
RBC # BLD AUTO: 3.51 10E12/L (ref 3.8–5.2)
SODIUM SERPL-SCNC: 138 MMOL/L (ref 133–144)
WBC # BLD AUTO: 8 10E9/L (ref 4–11)

## 2020-04-08 PROCEDURE — 25000128 H RX IP 250 OP 636: Performed by: COLON & RECTAL SURGERY

## 2020-04-08 PROCEDURE — 25000132 ZZH RX MED GY IP 250 OP 250 PS 637: Performed by: PHYSICIAN ASSISTANT

## 2020-04-08 PROCEDURE — 36415 COLL VENOUS BLD VENIPUNCTURE: CPT | Performed by: PHYSICIAN ASSISTANT

## 2020-04-08 PROCEDURE — 12000000 ZZH R&B MED SURG/OB

## 2020-04-08 PROCEDURE — 85027 COMPLETE CBC AUTOMATED: CPT | Performed by: PHYSICIAN ASSISTANT

## 2020-04-08 PROCEDURE — 80048 BASIC METABOLIC PNL TOTAL CA: CPT | Performed by: PHYSICIAN ASSISTANT

## 2020-04-08 PROCEDURE — 99207 ZZC CONSULT E&M CHANGED TO INITIAL LEVEL: CPT | Performed by: INTERNAL MEDICINE

## 2020-04-08 PROCEDURE — 25000132 ZZH RX MED GY IP 250 OP 250 PS 637: Performed by: COLON & RECTAL SURGERY

## 2020-04-08 PROCEDURE — 99222 1ST HOSP IP/OBS MODERATE 55: CPT | Performed by: INTERNAL MEDICINE

## 2020-04-08 RX ADMIN — ATENOLOL 25 MG: 25 TABLET ORAL at 08:58

## 2020-04-08 RX ADMIN — ENOXAPARIN SODIUM 40 MG: 40 INJECTION SUBCUTANEOUS at 11:22

## 2020-04-08 RX ADMIN — ACETAMINOPHEN 975 MG: 325 TABLET, FILM COATED ORAL at 19:00

## 2020-04-08 RX ADMIN — ALVIMOPAN 12 MG: 12 CAPSULE ORAL at 21:35

## 2020-04-08 RX ADMIN — LEFLUNOMIDE 10 MG: 10 TABLET ORAL at 08:58

## 2020-04-08 RX ADMIN — METRONIDAZOLE 500 MG: 500 INJECTION, SOLUTION INTRAVENOUS at 11:48

## 2020-04-08 RX ADMIN — ACETAMINOPHEN 975 MG: 325 TABLET, FILM COATED ORAL at 11:21

## 2020-04-08 RX ADMIN — ALVIMOPAN 12 MG: 12 CAPSULE ORAL at 11:21

## 2020-04-08 RX ADMIN — METRONIDAZOLE 500 MG: 500 INJECTION, SOLUTION INTRAVENOUS at 04:48

## 2020-04-08 RX ADMIN — CIPROFLOXACIN 400 MG: 2 INJECTION, SOLUTION INTRAVENOUS at 12:51

## 2020-04-08 RX ADMIN — CIPROFLOXACIN 400 MG: 2 INJECTION, SOLUTION INTRAVENOUS at 00:50

## 2020-04-08 RX ADMIN — ACETAMINOPHEN 975 MG: 325 TABLET, FILM COATED ORAL at 02:47

## 2020-04-08 RX ADMIN — GABAPENTIN 300 MG: 300 CAPSULE ORAL at 21:27

## 2020-04-08 ASSESSMENT — ACTIVITIES OF DAILY LIVING (ADL)
ADLS_ACUITY_SCORE: 16

## 2020-04-08 NOTE — PROGRESS NOTES
COLON & RECTAL SURGERY  PROGRESS NOTE    April 8, 2020  Post-op Day # 1    SUBJECTIVE:  Feels well.  Having some soreness and is taking Tylenol.  Not wanting to take anything stronger at this point.  Has ambulated a few times since surgery.  Tolerating clear liquid diet with no nausea.  No flatus or stool.  Marmolejo in place.  Creatinine 1.2 (improved from 1.34 yesterday)    OBJECTIVE:  Temp:  [96.4  F (35.8  C)-98.4  F (36.9  C)] 97.8  F (36.6  C)  Pulse:  [69-83] 69  Heart Rate:  [70-82] 75  Resp:  [10-71] 16  BP: (111-158)/(55-91) 151/76  SpO2:  [91 %-100 %] 95 %    Intake/Output Summary (Last 24 hours) at 4/8/2020 0918  Last data filed at 4/8/2020 0613  Gross per 24 hour   Intake 2477 ml   Output 1055 ml   Net 1422 ml       GENERAL:  Awake, alert, no acute distress, resting in bed, appears comfortable  HEAD: Nomocephalic atraumatic  RESPIRATORY: unlabored breathing on room air   EXTREMITIES: warm and well perfused  ABDOMEN:  Soft, appropriately tender, non-distended  INCISION:  C/D/I with some bruising around the midline incision     LABS:  Lab Results   Component Value Date    WBC 8.0 04/08/2020     Lab Results   Component Value Date    HGB 11.1 04/08/2020     Lab Results   Component Value Date    HCT 35.8 04/08/2020     Lab Results   Component Value Date     04/08/2020     Last Basic Metabolic Panel:  Lab Results   Component Value Date     04/08/2020      Lab Results   Component Value Date    POTASSIUM 4.8 04/08/2020     Lab Results   Component Value Date    CHLORIDE 110 04/08/2020     Lab Results   Component Value Date    NARESH 8.4 04/08/2020     Lab Results   Component Value Date    CO2 25 04/08/2020     Lab Results   Component Value Date    BUN 20 04/08/2020     Lab Results   Component Value Date    CR 1.20 04/08/2020     Lab Results   Component Value Date     04/08/2020       ASSESSMENT/PLAN: POD #1 s/p lap right hemicolectomy for a colon cancer  - path pending  - ambulate  - remove Marmolejo  -  advance to full liquid diet  - pain control  - possible discharge tomorrow if doing well      For questions/paging, please contact the CRS office at [phone].    Haydee Sneed PA-C  [facility]  Phone: [phone]    Colon and Rectal Surgery Attending Note    Patient seen and examined independently.  Agree with above assessment and plan.  Feeling well. Up in the chair.   abd soft, incisions CDI with mild bruising  Plan  Full liquids  Minimize narcotic  Path pending    Skye Mays MD  [facility]  Office Phone # [phone]

## 2020-04-08 NOTE — PLAN OF CARE
Pt tolerated clears, then full liquids.  Bowel sounds are now active and audible x4. This AM, bowel sounds were faint.  Denies passing any flatus yet.  Lap sites are intact with dermabond. Umbilicus with ecchymosis vs erythema, outlined with skin marker.  Bruising around other sites minimally. Vital signs are stable.  Pt has ambulated the unit at least three times today.Marmolejo removed, and pt is voiding spontaneously.  Pain is well controlled with tylenol.  Pt complains of mid-abd pain that may be gas pain.  Will continue to monitor.  Continued IV flagyl and cipro orders, IV is saline locked.

## 2020-04-08 NOTE — CONSULTS
Hospitalist Consultation      Avery Arteaga MRN# [id number]   YOB: 1945 Age: 75 year old   Date of Admission: 4/7/2020     Requesting Physician:  Dr. Mays  Reason for consult: Medical management           Assessment and Plan:   This patient is a 75 year old female with a PMH significant for COPD, RA, HTN and recent diagnosis of right colon cancer who is POD 1 s/p right hemicolectomy.     #S/p right hemicolectomy due to recently diagnosed colon cancer  Pain control optimal on current regimen. Drinking clear liquids with no nausea or vomiting. Has yet to pass gas. Does have some tender erythema around incision near umbilicus. Followed by Dr. Gonzalez from [facility]  -Diet, pain control, DVT prophylaxis and antibiotics per colo rectal service  -Asked nurse to [place] area of erythema near umbilicus and monitor      #COPD  #Previous smoker  #Recent pneumothorax after lung biopsy  Breathing comfortably on room air without wheezes, shortness of breath or cough.  Quit smoking in January. Had lung biopsy on 3/12 for staging purposes which resulted in pneumothorax requiring chest tube placement. Was admitted from 3/12-3/22 at [facility]. Lung nodule ended up being necrotizing granulomatous inflammation.  -Continue PTA inhaler    #HTN  -Continue PTA Atenolol  -Hold Lisinopril due to LESA    #LESA  Baseline creatinine between 0.8-1 with current creatinine of 1.2.   -Increase LR to 100 ml/hr  -Hold Lisinopril  -Avoid nephrotoxins  -Recheck BMP in AM    #RA  Stable on Methotrexate and Leflunamide  -Takes Methotrexate on Monday  -Resumed Leflunamide    #Restless legs  -Continue PTA gabapentin    DVT Prophylaxis: on Lovenox as per primary  D/C planning: To home once cleared by surgery  Marmolejo: In place             History of Present Illness:   This patient is a 75 year old female who is POD 1 s/p right hemicolectomy.  Intra-op report reviewed and showed no intra-op complications.   I/o's reviewed, Currently net positive with  good UOP since OR. Hgb stable this am at 11.1.  Overnight did pretty well, no complaints, VSS.   Currently, today dexter diet, pain controlled, no CP, SOB, no n/v.   O/w other medical problems have been stable, with no recent c/o illness.               Past Medical History:     Past Medical History:   Diagnosis Date     COPD (chronic obstructive pulmonary disease) (H)      Hypertension      Rheumatoid arthritis (H)                Past Surgical History:     Past Surgical History:   Procedure Laterality Date     ------------OTHER------------- Right     Right middle finger RA nodule removed     brochoscopy      x 2     COLONOSCOPY                   Social History:     Social History     Tobacco Use     Smoking status: Former Smoker     Packs/day: 0.25     Years: 55.00     Pack years: 13.75     Types: Cigarettes     Last attempt to quit: 1/10/2020     Years since quittin.2     Smokeless tobacco: Never Used   Substance Use Topics     Alcohol use: Not Currently     Frequency: Never     Drug use: Never                 Family History:   Family Hx fully reviewed and is non contributory to this admission.             Allergies:   No Known Allergies          Medications:     Prior to Admission medications    Medication Sig Last Dose Taking? Auth Provider   albuterol (PROAIR HFA/PROVENTIL HFA/VENTOLIN HFA) 108 (90 Base) MCG/ACT inhaler Inhale 2 puffs into the lungs every 4 hours as needed  Yes Unknown, Entered By History   aspirin 81 MG EC tablet Take 81 mg by mouth daily  Yes Unknown, Entered By History   atenolol (TENORMIN) 25 MG tablet Take 25 mg by mouth daily  Yes Unknown, Entered By History   Cholecalciferol (VITAMIN D3) 25 MCG (1000 UT) CAPS Take 2,000 Units by mouth daily  Yes Unknown, Entered By History   fa-pyridoxine-cyancobalamin 2.5-25-2 MG TABS per tablet Take 1 tablet by mouth daily  Yes Unknown, Entered By History   Fluticasone-Umeclidin-Vilanterol (TRELEGY ELLIPTA) 100-62.5-25 MCG/INH oral inhaler Inhale 1  "puff into the lungs daily  Yes Unknown, Entered By History   gabapentin (NEURONTIN) 300 MG capsule Take 300 mg by mouth At Bedtime  Yes Reported, Patient   leflunomide (ARAVA) 10 MG tablet Take 10 mg by mouth daily  Yes Unknown, Entered By History   lisinopril (PRINIVIL/ZESTRIL) 5 MG tablet Take 20 mg by mouth daily   Yes Unknown, Entered By History   methotrexate 2.5 MG tablet Take 22.5 mg by mouth every 7 days On Mondays  Yes Unknown, Entered By History   multivitamin  with lutein (OCUVITE WITH LTEIN) CAPS per capsule Take 1 capsule by mouth daily  Yes Unknown, Entered By History   naproxen sodium (ANAPROX) 220 MG tablet Take 220 mg by mouth 2 times daily as needed for moderate pain or headaches  Yes Unknown, Entered By History   ipratropium - albuterol 0.5 mg/2.5 mg/3 mL (DUONEB) 0.5-2.5 (3) MG/3ML neb solution Take 1 vial (3 mLs) by nebulization every 6 hours as needed for shortness of breath / dyspnea or wheezing   Gina Mckeon,                Review of Systems:   A comprehensive greater than 10 system review of systems was carried out.  Pertinent positives and negatives are noted above.  Otherwise negative for contributory info.            Physical Exam:   Vitals were reviewed  Blood pressure (!) 151/76, pulse 69, temperature 97.8  F (36.6  C), temperature source Oral, resp. rate 16, height 1.651 m (5' 5\"), weight 74.3 kg (163 lb 11.2 oz), SpO2 95 %.  Exam:    GENERAL:  Comfortable.  PSYCH: pleasant, oriented, No acute distress.  HEENT:  PERRLA. Normal conjunctiva, normal hearing, nasal mucosa and Oropharynx are normal.  NECK:  Supple, no neck vein distention, adenopathy or bruits, normal thyroid.  HEART:  Normal S1, S2 with no murmur, no pericardial rub, S3 or S4.  LUNGS:  Clear to auscultation, normal Respiratory effort.  ABDOMEN:  Soft, no hepatosplenomegaly, normal bowel sounds. Some mild redness around incision near umbilicus.  EXTREMITIES:  No pedal edema, +2 pulses bilateral and " equal.  SKIN:  Dry to touch, No rash, wound or ulcerations.  NEUROLOGIC:  Nonfocal with normal cranial nerve and motor power and sensation.            Data:   Past 24 hours labs, studies, and imaging were reviewed.  Recent Labs   Lab 04/08/20  0643 04/07/20  1623 04/07/20  1038   WBC 8.0  --  9.5   HGB 11.1*  --  12.6   HCT 35.8  --  40.5   *  --  100    212 274     Recent Labs   Lab 04/08/20  0643 04/07/20  1623     --    POTASSIUM 4.8  --    CHLORIDE 110*  --    CO2 25  --    ANIONGAP 3  --    *  --    BUN 20  --    CR 1.20* 1.34*   GFRESTIMATED 44* 39*   GFRESTBLACK 51* 45*   NARESH 8.4*  --        Carrie Pereira PA-C    Pt discussed with Dr. Pérez who agrees with the care as discussed above.

## 2020-04-08 NOTE — CONSULTS
CTS identifies pt as high risk due to Elevated DAVID. Patient was admitted for a planned hemicolectomy. Patient has a diagnosis of colon cancer and follows with [facility] Dr. Gonzalez. Patient has a history of smoking, COPD, RA, HTN, and RLS. Patient has had one previous hospitalization at [facility] within the past 6 months in January for COPD.   Patient lives at [facility].   A hospital follow up appointment was not scheduled for the patient at this time.      No handoff will be given to PCP clinic care coordinator at discharge as no gap in care identified.     CM will continue to follow patient for any additional discharge needs.     Skye Lujan MA/RN Case Manager  Inpatient Care Coordination  [facility]   [phone]

## 2020-04-08 NOTE — PLAN OF CARE
To Do:  End of Shift Summary  For vital signs and complete assessments, please see documentation flowsheets.     Pertinent assessments: Incisional pain reported 5/10. Pt ambulated around room and marched in place. Denies nausea. Marmolejo in place. No edema noted.     Major Shift Events Pt ambulated in room. Tolerated clear liquid diet well  Treatment Plan: Post op Abx treatment/     Discharge Readiness: Medically active  Expected Discharge Date: TBD  Discharge Disposition: Home with Self care  Barriers/Criteria for discharge

## 2020-04-08 NOTE — CONSULTS
NUTRITION EDUCATION      REASON FOR NUTRITION CONSULT:  Provider Order  -  Nutrition Education on a low residue diet (post op order set)    ASSESSMENT:  Defer complete nutrition assessment and instructions. Discussed during IDT rounds this AM - if additional education is needed beyond a low fiber diet handout, RD can offer education on phone.     FOLLOW UP:   Will follow per protocol. Please page/consult as needed.    Elda Argueta MS, RDN, LD, CNSC  Pager - 3rd floor/ICU: [phone]  Pager - All other floors: [phone]  Pager - Weekend/holiday: [phone]  Office: [phone]

## 2020-04-09 VITALS
RESPIRATION RATE: 16 BRPM | DIASTOLIC BLOOD PRESSURE: 76 MMHG | OXYGEN SATURATION: 95 % | HEIGHT: 65 IN | WEIGHT: 163.7 LBS | SYSTOLIC BLOOD PRESSURE: 153 MMHG | TEMPERATURE: 96.1 F | HEART RATE: 69 BPM | BODY MASS INDEX: 27.27 KG/M2

## 2020-04-09 LAB
ANION GAP SERPL CALCULATED.3IONS-SCNC: 3 MMOL/L (ref 3–14)
BUN SERPL-MCNC: 16 MG/DL (ref 7–30)
CALCIUM SERPL-MCNC: 8.2 MG/DL (ref 8.5–10.1)
CHLORIDE SERPL-SCNC: 110 MMOL/L (ref 94–109)
CO2 SERPL-SCNC: 27 MMOL/L (ref 20–32)
COPATH REPORT: NORMAL
CREAT SERPL-MCNC: 1.08 MG/DL (ref 0.52–1.04)
GFR SERPL CREATININE-BSD FRML MDRD: 50 ML/MIN/{1.73_M2}
GLUCOSE SERPL-MCNC: 90 MG/DL (ref 70–99)
POTASSIUM SERPL-SCNC: 5.1 MMOL/L (ref 3.4–5.3)
SODIUM SERPL-SCNC: 140 MMOL/L (ref 133–144)

## 2020-04-09 PROCEDURE — 25000128 H RX IP 250 OP 636: Performed by: COLON & RECTAL SURGERY

## 2020-04-09 PROCEDURE — 25000132 ZZH RX MED GY IP 250 OP 250 PS 637: Performed by: INTERNAL MEDICINE

## 2020-04-09 PROCEDURE — 36415 COLL VENOUS BLD VENIPUNCTURE: CPT | Performed by: PHYSICIAN ASSISTANT

## 2020-04-09 PROCEDURE — 25000132 ZZH RX MED GY IP 250 OP 250 PS 637: Performed by: COLON & RECTAL SURGERY

## 2020-04-09 PROCEDURE — 80048 BASIC METABOLIC PNL TOTAL CA: CPT | Performed by: PHYSICIAN ASSISTANT

## 2020-04-09 PROCEDURE — 40000275 ZZH STATISTIC RCP TIME EA 10 MIN

## 2020-04-09 PROCEDURE — 94640 AIRWAY INHALATION TREATMENT: CPT

## 2020-04-09 PROCEDURE — 40000809 ZZH STATISTIC NO DOCUMENTATION TO SUPPORT CHARGE

## 2020-04-09 PROCEDURE — 25000132 ZZH RX MED GY IP 250 OP 250 PS 637: Performed by: PHYSICIAN ASSISTANT

## 2020-04-09 RX ORDER — OXYCODONE HYDROCHLORIDE 5 MG/1
5-10 TABLET ORAL EVERY 4 HOURS PRN
Status: DISCONTINUED | OUTPATIENT
Start: 2020-04-09 | End: 2020-04-09 | Stop reason: HOSPADM

## 2020-04-09 RX ORDER — ACETAMINOPHEN 325 MG/1
650-975 TABLET ORAL EVERY 8 HOURS PRN
Qty: 90 TABLET | Refills: 0 | Status: ON HOLD | OUTPATIENT
Start: 2020-04-09 | End: 2020-05-21

## 2020-04-09 RX ADMIN — ALVIMOPAN 12 MG: 12 CAPSULE ORAL at 09:32

## 2020-04-09 RX ADMIN — LEFLUNOMIDE 10 MG: 10 TABLET ORAL at 09:06

## 2020-04-09 RX ADMIN — ATENOLOL 25 MG: 25 TABLET ORAL at 09:06

## 2020-04-09 RX ADMIN — ENOXAPARIN SODIUM 40 MG: 40 INJECTION SUBCUTANEOUS at 11:19

## 2020-04-09 RX ADMIN — ACETAMINOPHEN 975 MG: 325 TABLET, FILM COATED ORAL at 11:19

## 2020-04-09 RX ADMIN — OXYCODONE HYDROCHLORIDE 5 MG: 5 TABLET ORAL at 09:31

## 2020-04-09 RX ADMIN — FLUTICASONE FUROATE AND VILANTEROL TRIFENATATE 1 PUFF: 100; 25 POWDER RESPIRATORY (INHALATION) at 07:59

## 2020-04-09 RX ADMIN — ACETAMINOPHEN 975 MG: 325 TABLET, FILM COATED ORAL at 03:34

## 2020-04-09 RX ADMIN — UMECLIDINIUM 1 PUFF: 62.5 AEROSOL, POWDER ORAL at 07:59

## 2020-04-09 ASSESSMENT — ACTIVITIES OF DAILY LIVING (ADL)
ADLS_ACUITY_SCORE: 14

## 2020-04-09 NOTE — PROGRESS NOTES
Pt to D/C to home.  Pt provided with d/c instructions, including new medications, when medications were last given, and when to take them again.  Pt also informed to f/u with colorectal with in 3 weeks.  Pt verbalized understanding of all d/c and f/u instructions.  All questions were answered at this time.  Copy of paperwork sent with pt. Medications sent with patient. Lovenox teaching completed.  Daughter to care for pt at home & to provide transport.  All personal belongings sent with pt (clothing, shoes, glasses, phone, purse).

## 2020-04-09 NOTE — DISCHARGE SUMMARY
[facility] Discharge Summary      Avery Arteaga MRN# [id number]   Age: 75 year old YOB: 1945     Date of Admission:  4/7/2020  Date of Discharge::  4/9/2020  Admitting Physician:  Skye Mays MD  Discharge Physician:  Skye Mays MD     PCP:  Althea Rai    Disposition: Patient discharged from [facility] to home in stable condition.        Primary Diagnosis:   Right Colon Cancer             Discharge Medications:     Current Discharge Medication List      START taking these medications    Details   acetaminophen (TYLENOL) 325 MG tablet Take 2-3 tablets (650-975 mg) by mouth every 8 hours as needed for mild pain  Qty: 90 tablet, Refills: 0    Associated Diagnoses: Cancer of right colon (H)      enoxaparin ANTICOAGULANT (LOVENOX) 40 MG/0.4ML syringe Inject 0.4 mLs (40 mg) Subcutaneous every 24 hours for 28 doses  Qty: 11.2 mL, Refills: 0    Associated Diagnoses: Cancer of right colon (H)         CONTINUE these medications which have CHANGED    Details   methotrexate 2.5 MG tablet Take 9 tablets (22.5 mg) by mouth every 7 days On Mondays.  Skip Monday April 13 and resume on Monday April 20  Qty:      Associated Diagnoses: Cancer of right colon (H)         CONTINUE these medications which have NOT CHANGED    Details   albuterol (PROAIR HFA/PROVENTIL HFA/VENTOLIN HFA) 108 (90 Base) MCG/ACT inhaler Inhale 2 puffs into the lungs every 4 hours as needed    Comments: Pharmacy may dispense brand covered by insurance (Proair, or proventil or ventolin or generic albuterol inhaler)      aspirin 81 MG EC tablet Take 81 mg by mouth daily      atenolol (TENORMIN) 25 MG tablet Take 25 mg by mouth daily      Cholecalciferol (VITAMIN D3) 25 MCG (1000 UT) CAPS Take 2,000 Units by mouth daily      fa-pyridoxine-cyancobalamin 2.5-25-2 MG TABS per tablet Take 1 tablet by mouth daily      Fluticasone-Umeclidin-Vilanterol (TRELEGY ELLIPTA) 100-62.5-25 MCG/INH oral inhaler Inhale 1 puff  into the lungs daily      gabapentin (NEURONTIN) 300 MG capsule Take 300 mg by mouth At Bedtime      leflunomide (ARAVA) 10 MG tablet Take 10 mg by mouth daily      lisinopril (PRINIVIL/ZESTRIL) 5 MG tablet Take 20 mg by mouth daily       multivitamin  with lutein (OCUVITE WITH LTEIN) CAPS per capsule Take 1 capsule by mouth daily      naproxen sodium (ANAPROX) 220 MG tablet Take 220 mg by mouth 2 times daily as needed for moderate pain or headaches      ipratropium - albuterol 0.5 mg/2.5 mg/3 mL (DUONEB) 0.5-2.5 (3) MG/3ML neb solution Take 1 vial (3 mLs) by nebulization every 6 hours as needed for shortness of breath / dyspnea or wheezing  Qty: 1 Box, Refills: 0    Associated Diagnoses: COPD with acute exacerbation (H)                    Follow Up, Special Instructions:     Discharge diet: Low residue diet    Discharge activity: No heavy lifting or straining.    Discharge follow-up: Follow up in 3 weeks in our clinic.              Procedures:     Procedure(s): Laparoscopic right hemicolectomy        Pathology:     SPECIMEN(S):   Right colon portion     FINAL DIAGNOSIS:   Terminal ileum, right colon and appendix, laparoscopic - assisted right   hemicolectomy:     Specimen        Specimen:    Terminal ileum, cecum, ascending colon and appendix.        Procedure:    Laparoscopic -assisted right hemicolectomy.        Primary Tumor Site:   Ascending colon.        Additional Sites Involved by Tumor:   None identified        Macroscopic Tumor Perforation:   Not identified     Tumor        Histologic Type:   Adenocarcinoma with mucinous differentiation.        Histologic Grade:  Histologic grade 2 (moderately differentiated).     Extent        Tumor Size:   2 cm.        Microscopic Tumor Extension:   Tumor invades through the muscularis   propria into the pericolonic tissue.   No involvement of peritoneal surface.     Margins        Status of Margin Involvement:   All margins uninvolved by invasive   carcinoma              Distance of Invasive Carcinoma from Closest Margin:  Carcinoma   at 7.5 cm from proximal margin, 11 cm   from distal margin and 11.5 cm from mesenteric margin.        Proximal Margin:   Uninvolved by invasive carcinoma.  No adenoma or   intraepithelial neoplasia / dysplasia   identified.        Distal Margin:   Uninvolved by invasive carcinoma.  No adenoma or   intraepithelial neoplasia / dysplasia   identified.        Mesenteric Margin:   Uninvolved by invasive carcinoma.     Accessory Findings        Lymph-Vascular Invasion:   Present        Perineural Invasion:   Not identified        Tumor Deposits (discontinuous extramural extension):   Not identified        Type of Polyp in Which Invasive Carcinoma Arose:   Sessile serrated   adenoma with dysplasia.       Tumor budding: Present; low score (0 - 4).     Pathologic Staging (pTNM)        Primary Tumor (pT):    pT3: Tumor invades through the muscularis   propria into radha colorectal tissues        Regional Lymph Nodes (pN):   pN1b:  Metastasis in 2 regional lymph   nodes             Number of Lymph Nodes Examined:   44             Number of Lymph Nodes Involved:      2        Distant Metastasis (pM):   pM N/A     Additional Pathologic Findings:   Appendix with fibrolipomatous   obliteration.   Ancillary Studies: Mismatch repair protein expression immunochemical   stains could be performed upon request.          Brief Hospital Summary:     Patient is a 75 year old female who underwent a laparoscopic right hemicolectomy on April 7, 2020 by [facility].   There were no immediate complications during this procedure.  Please refer to the full operative summary for details.  The patient's hospital course was unremarkable.  she recovered as anticipated and experienced no post-operative complications.  Diet was advanced.  Patient was discharged home with a prescription for Tylenol and a total of 30 days of Lovenox for DVT prophylaxis.  She did not want a prescription for  narcotic pain mediation.        Haydee Sneed PA-C  [facility]  [phone]        ADDENDUM:  Length of stay: 2 days  Indicate Y or N for the following:  UTI n   C diff n  PNA n  SSI n  DVT n  PE n  CVA n  MI n  Enterocutaneous fistula n  Peripheral nerve injury n  Abscess (not adjacent to anastomosis) n  Leak n     Death within 30 days n  Reintubation n  Reoperation n      FOR CANCER CASES:  T stage (1,2,3,4): 3  N stage (0,1,2): 1   Total number of nodes: 44   Total positive: 2  M stage (0,1): 0  R (0,1,2): 0  TME grade, if known (1,2,3): n/a  MSI (pos, neg): neg

## 2020-04-09 NOTE — PROGRESS NOTES
COLON & RECTAL SURGERY  PROGRESS NOTE    April 9, 2020  Post-op Day # 2    SUBJECTIVE:  Feels well.  Having some incisional pain.  Feels better with application of ice pack.  Taking only Tylenol for pain.  Has been ambulating.  Tolerating full liquid diet with no nausea.  No flatus or stools.  Creatinine improving, 1.08 this morning.      OBJECTIVE:  Temp:  [95.5  F (35.3  C)-96.6  F (35.9  C)] 96.1  F (35.6  C)  Heart Rate:  [65-75] 65  Resp:  [16-18] 16  BP: (122-153)/(57-76) 153/76  SpO2:  [93 %-97 %] 95 %    Intake/Output Summary (Last 24 hours) at 4/9/2020 0954  Last data filed at 4/9/2020 0804  Gross per 24 hour   Intake 1447 ml   Output 1600 ml   Net -153 ml       GENERAL:  Awake, alert, no acute distress, resting in bed  HEAD: Nomocephalic atraumatic  RESPIRATORY: unlabored breathing on room air   EXTREMITIES: warm and well perfused  ABDOMEN:  Soft, appropriately tender, non-distended  INCISION:  C/D/I, increased bruising around midline wound     LABS:  Lab Results   Component Value Date    WBC 8.0 04/08/2020     Lab Results   Component Value Date    HGB 11.1 04/08/2020     Lab Results   Component Value Date    HCT 35.8 04/08/2020     Lab Results   Component Value Date     04/08/2020     Last Basic Metabolic Panel:  Lab Results   Component Value Date     04/09/2020      Lab Results   Component Value Date    POTASSIUM 5.1 04/09/2020     Lab Results   Component Value Date    CHLORIDE 110 04/09/2020     Lab Results   Component Value Date    NARESH 8.2 04/09/2020     Lab Results   Component Value Date    CO2 27 04/09/2020     Lab Results   Component Value Date    BUN 16 04/09/2020     Lab Results   Component Value Date    CR 1.08 04/09/2020     Lab Results   Component Value Date    GLC 90 04/09/2020       ASSESSMENT/PLAN: POD #2 s/p lap right hemicolectomy for a colon cancer  - path pending  - ambulate  - low fiber diet  - discussed discharge instructions   - possible discharge to home later this  afternoon   - will need 30 days of Lovenox at discharge -- please do Lovenox teaching         Haydee Sneed PA-C  [facility]  Phone: [phone]    Colon and Rectal [facility] Attending Note    Patient seen and examined independently.  Agree with above assessment and plan.  Feeling well. Up walking some. No nausea with low residue diet  + flatus, no BM  abd soft, incisions with midl bruising, no signs of infection  Plan  Reviewed pathology T3, N1 (2/44 nodes positive).  Oncology follow up as outpatient.  Discharge this afternoon with lovenox for total of 30 days    Skye Mays MD  [facility]  Office Phone # [phone]

## 2020-04-09 NOTE — PLAN OF CARE
POD2 of Rt Hemicolectomy  Vitals:   Temp: 96.6  F (35.9  C) Temp src: Oral BP: 134/67 Pulse: 69 Heart Rate: 72 Resp: 17 SpO2: 94 % O2 Device: None (Room air)   Neuro: A&Ox4  Lungs: WDL, uses IS on own  Cardiac: WDL  GI: BS+, not passing gas  : WDL, voiding adequately   Skin: 3 lap sites to abdomen with derma bond, umbilicus incision marked on previous shift, redness extending past marking, ice applied, unchanged throughout this shift  IV: PIV SL  Labs: Creat:1.2, hgb:11.1  Diet: Full liquid, tolerating well, reports feeling hungry  Pain: 3/10 to umbilicus, ice applied, tylenol given  Activity: ind in room   Plan: colorectal following, possible discharge today

## 2020-04-15 ENCOUNTER — DOCUMENTATION ONLY (OUTPATIENT)
Dept: OTHER | Facility: CLINIC | Age: 75
End: 2020-04-15

## 2020-05-13 ENCOUNTER — TRANSFERRED RECORDS (OUTPATIENT)
Dept: HEALTH INFORMATION MANAGEMENT | Facility: CLINIC | Age: 75
End: 2020-05-13

## 2020-05-21 ENCOUNTER — HOSPITAL ENCOUNTER (OUTPATIENT)
Facility: CLINIC | Age: 75
Setting detail: OBSERVATION
Discharge: HOME | End: 2020-05-22
Attending: PHYSICIAN ASSISTANT | Admitting: INTERNAL MEDICINE
Payer: COMMERCIAL

## 2020-05-21 ENCOUNTER — APPOINTMENT (OUTPATIENT)
Dept: CT IMAGING | Facility: CLINIC | Age: 75
End: 2020-05-21
Attending: PHYSICIAN ASSISTANT
Payer: COMMERCIAL

## 2020-05-21 DIAGNOSIS — E87.6 HYPOKALEMIA: ICD-10-CM

## 2020-05-21 DIAGNOSIS — N17.9 AKI (ACUTE KIDNEY INJURY) (H): ICD-10-CM

## 2020-05-21 DIAGNOSIS — R19.7 DIARRHEA: ICD-10-CM

## 2020-05-21 PROBLEM — K52.9 AGE (ACUTE GASTROENTERITIS): Status: ACTIVE | Noted: 2020-05-21

## 2020-05-21 LAB
ALBUMIN SERPL-MCNC: 2.6 G/DL (ref 3.4–5)
ALP SERPL-CCNC: 57 U/L (ref 40–150)
ALT SERPL W P-5'-P-CCNC: 13 U/L (ref 0–50)
ANION GAP SERPL CALCULATED.3IONS-SCNC: 8 MMOL/L (ref 3–14)
AST SERPL W P-5'-P-CCNC: 18 U/L (ref 0–45)
BASOPHILS # BLD AUTO: 0 10E9/L (ref 0–0.2)
BASOPHILS NFR BLD AUTO: 0.6 %
BILIRUB SERPL-MCNC: 0.6 MG/DL (ref 0.2–1.3)
BUN SERPL-MCNC: 39 MG/DL (ref 7–30)
CALCIUM SERPL-MCNC: 8.6 MG/DL (ref 8.5–10.1)
CHLORIDE SERPL-SCNC: 108 MMOL/L (ref 94–109)
CO2 SERPL-SCNC: 23 MMOL/L (ref 20–32)
CREAT SERPL-MCNC: 1.83 MG/DL (ref 0.52–1.04)
DIFFERENTIAL METHOD BLD: ABNORMAL
EOSINOPHIL # BLD AUTO: 0.1 10E9/L (ref 0–0.7)
EOSINOPHIL NFR BLD AUTO: 1.3 %
ERYTHROCYTE [DISTWIDTH] IN BLOOD BY AUTOMATED COUNT: 16.5 % (ref 10–15)
GFR SERPL CREATININE-BSD FRML MDRD: 26 ML/MIN/{1.73_M2}
GLUCOSE SERPL-MCNC: 115 MG/DL (ref 70–99)
HCT VFR BLD AUTO: 38.6 % (ref 35–47)
HGB BLD-MCNC: 12.2 G/DL (ref 11.7–15.7)
IMM GRANULOCYTES # BLD: 0 10E9/L (ref 0–0.4)
IMM GRANULOCYTES NFR BLD: 0.4 %
LIPASE SERPL-CCNC: 90 U/L (ref 73–393)
LYMPHOCYTES # BLD AUTO: 0.7 10E9/L (ref 0.8–5.3)
LYMPHOCYTES NFR BLD AUTO: 14.7 %
MAGNESIUM SERPL-MCNC: 2.3 MG/DL (ref 1.6–2.3)
MCH RBC QN AUTO: 31.7 PG (ref 26.5–33)
MCHC RBC AUTO-ENTMCNC: 31.6 G/DL (ref 31.5–36.5)
MCV RBC AUTO: 100 FL (ref 78–100)
MONOCYTES # BLD AUTO: 0.8 10E9/L (ref 0–1.3)
MONOCYTES NFR BLD AUTO: 16.6 %
NEUTROPHILS # BLD AUTO: 3.1 10E9/L (ref 1.6–8.3)
NEUTROPHILS NFR BLD AUTO: 66.4 %
NRBC # BLD AUTO: 0 10*3/UL
NRBC BLD AUTO-RTO: 0 /100
PLATELET # BLD AUTO: 323 10E9/L (ref 150–450)
POTASSIUM SERPL-SCNC: 2.9 MMOL/L (ref 3.4–5.3)
POTASSIUM SERPL-SCNC: 3.1 MMOL/L (ref 3.4–5.3)
PROT SERPL-MCNC: 6.2 G/DL (ref 6.8–8.8)
RBC # BLD AUTO: 3.85 10E12/L (ref 3.8–5.2)
SODIUM SERPL-SCNC: 139 MMOL/L (ref 133–144)
WBC # BLD AUTO: 4.6 10E9/L (ref 4–11)

## 2020-05-21 PROCEDURE — 36415 COLL VENOUS BLD VENIPUNCTURE: CPT | Performed by: INTERNAL MEDICINE

## 2020-05-21 PROCEDURE — 25800030 ZZH RX IP 258 OP 636: Performed by: PHYSICIAN ASSISTANT

## 2020-05-21 PROCEDURE — 99220 ZZC INITIAL OBSERVATION CARE,LEVL III: CPT | Performed by: INTERNAL MEDICINE

## 2020-05-21 PROCEDURE — 96360 HYDRATION IV INFUSION INIT: CPT | Mod: 59

## 2020-05-21 PROCEDURE — 80053 COMPREHEN METABOLIC PANEL: CPT | Performed by: PHYSICIAN ASSISTANT

## 2020-05-21 PROCEDURE — 25800030 ZZH RX IP 258 OP 636: Performed by: INTERNAL MEDICINE

## 2020-05-21 PROCEDURE — 74176 CT ABD & PELVIS W/O CONTRAST: CPT

## 2020-05-21 PROCEDURE — 96361 HYDRATE IV INFUSION ADD-ON: CPT

## 2020-05-21 PROCEDURE — 83735 ASSAY OF MAGNESIUM: CPT | Performed by: PHYSICIAN ASSISTANT

## 2020-05-21 PROCEDURE — 87493 C DIFF AMPLIFIED PROBE: CPT | Mod: XU | Performed by: PHYSICIAN ASSISTANT

## 2020-05-21 PROCEDURE — 85025 COMPLETE CBC W/AUTO DIFF WBC: CPT | Performed by: PHYSICIAN ASSISTANT

## 2020-05-21 PROCEDURE — 83690 ASSAY OF LIPASE: CPT | Performed by: PHYSICIAN ASSISTANT

## 2020-05-21 PROCEDURE — 99285 EMERGENCY DEPT VISIT HI MDM: CPT | Mod: 25

## 2020-05-21 PROCEDURE — G0378 HOSPITAL OBSERVATION PER HR: HCPCS

## 2020-05-21 PROCEDURE — 25000132 ZZH RX MED GY IP 250 OP 250 PS 637: Performed by: PHYSICIAN ASSISTANT

## 2020-05-21 PROCEDURE — 25000132 ZZH RX MED GY IP 250 OP 250 PS 637: Performed by: INTERNAL MEDICINE

## 2020-05-21 PROCEDURE — 84132 ASSAY OF SERUM POTASSIUM: CPT | Performed by: INTERNAL MEDICINE

## 2020-05-21 RX ORDER — POTASSIUM CHLORIDE 1.5 G/1.58G
20-40 POWDER, FOR SOLUTION ORAL
Status: DISCONTINUED | OUTPATIENT
Start: 2020-05-21 | End: 2020-05-22 | Stop reason: HOSPADM

## 2020-05-21 RX ORDER — POTASSIUM CHLORIDE 7.45 MG/ML
10 INJECTION INTRAVENOUS
Status: DISCONTINUED | OUTPATIENT
Start: 2020-05-21 | End: 2020-05-22 | Stop reason: HOSPADM

## 2020-05-21 RX ORDER — ONDANSETRON 4 MG/1
4 TABLET, ORALLY DISINTEGRATING ORAL EVERY 6 HOURS PRN
Status: DISCONTINUED | OUTPATIENT
Start: 2020-05-21 | End: 2020-05-22 | Stop reason: HOSPADM

## 2020-05-21 RX ORDER — POTASSIUM CHLORIDE 1500 MG/1
20 TABLET, EXTENDED RELEASE ORAL ONCE
Status: COMPLETED | OUTPATIENT
Start: 2020-05-21 | End: 2020-05-21

## 2020-05-21 RX ORDER — POTASSIUM CHLORIDE 29.8 MG/ML
20 INJECTION INTRAVENOUS
Status: DISCONTINUED | OUTPATIENT
Start: 2020-05-21 | End: 2020-05-22 | Stop reason: HOSPADM

## 2020-05-21 RX ORDER — POTASSIUM CL/LIDO/0.9 % NACL 10MEQ/0.1L
10 INTRAVENOUS SOLUTION, PIGGYBACK (ML) INTRAVENOUS
Status: DISCONTINUED | OUTPATIENT
Start: 2020-05-21 | End: 2020-05-22 | Stop reason: HOSPADM

## 2020-05-21 RX ORDER — ONDANSETRON 2 MG/ML
4 INJECTION INTRAMUSCULAR; INTRAVENOUS EVERY 6 HOURS PRN
Status: DISCONTINUED | OUTPATIENT
Start: 2020-05-21 | End: 2020-05-22 | Stop reason: HOSPADM

## 2020-05-21 RX ORDER — NALOXONE HYDROCHLORIDE 0.4 MG/ML
.1-.4 INJECTION, SOLUTION INTRAMUSCULAR; INTRAVENOUS; SUBCUTANEOUS
Status: DISCONTINUED | OUTPATIENT
Start: 2020-05-21 | End: 2020-05-22 | Stop reason: HOSPADM

## 2020-05-21 RX ORDER — MAGNESIUM SULFATE HEPTAHYDRATE 40 MG/ML
4 INJECTION, SOLUTION INTRAVENOUS EVERY 4 HOURS PRN
Status: DISCONTINUED | OUTPATIENT
Start: 2020-05-21 | End: 2020-05-22 | Stop reason: HOSPADM

## 2020-05-21 RX ORDER — ACETAMINOPHEN 325 MG/1
650 TABLET ORAL EVERY 4 HOURS PRN
Status: DISCONTINUED | OUTPATIENT
Start: 2020-05-21 | End: 2020-05-22 | Stop reason: HOSPADM

## 2020-05-21 RX ORDER — LIDOCAINE 40 MG/G
CREAM TOPICAL
Status: DISCONTINUED | OUTPATIENT
Start: 2020-05-21 | End: 2020-05-22 | Stop reason: HOSPADM

## 2020-05-21 RX ORDER — SODIUM CHLORIDE 9 MG/ML
INJECTION, SOLUTION INTRAVENOUS CONTINUOUS
Status: DISCONTINUED | OUTPATIENT
Start: 2020-05-21 | End: 2020-05-22 | Stop reason: HOSPADM

## 2020-05-21 RX ORDER — SODIUM CHLORIDE 9 MG/ML
INJECTION, SOLUTION INTRAVENOUS CONTINUOUS
Status: DISCONTINUED | OUTPATIENT
Start: 2020-05-21 | End: 2020-05-21

## 2020-05-21 RX ORDER — POTASSIUM CHLORIDE 1500 MG/1
20-40 TABLET, EXTENDED RELEASE ORAL
Status: DISCONTINUED | OUTPATIENT
Start: 2020-05-21 | End: 2020-05-22 | Stop reason: HOSPADM

## 2020-05-21 RX ORDER — ACETAMINOPHEN 650 MG/1
650 SUPPOSITORY RECTAL EVERY 4 HOURS PRN
Status: DISCONTINUED | OUTPATIENT
Start: 2020-05-21 | End: 2020-05-22 | Stop reason: HOSPADM

## 2020-05-21 RX ADMIN — SODIUM CHLORIDE 2000 ML: 9 INJECTION, SOLUTION INTRAVENOUS at 15:32

## 2020-05-21 RX ADMIN — POTASSIUM CHLORIDE 20 MEQ: 1500 TABLET, EXTENDED RELEASE ORAL at 17:54

## 2020-05-21 RX ADMIN — POTASSIUM CHLORIDE 40 MEQ: 1500 TABLET, EXTENDED RELEASE ORAL at 22:44

## 2020-05-21 RX ADMIN — SODIUM CHLORIDE, PRESERVATIVE FREE: 5 INJECTION INTRAVENOUS at 21:38

## 2020-05-21 ASSESSMENT — ENCOUNTER SYMPTOMS
HEMATURIA: 0
DIAPHORESIS: 0
ABDOMINAL PAIN: 1
RHINORRHEA: 0
COUGH: 0
RECTAL PAIN: 0
FREQUENCY: 0
FATIGUE: 1
WHEEZING: 0
DYSURIA: 0
BLOOD IN STOOL: 0
CHILLS: 0
SHORTNESS OF BREATH: 0
SORE THROAT: 0
VOMITING: 1
DIARRHEA: 1
FEVER: 0

## 2020-05-21 ASSESSMENT — MIFFLIN-ST. JEOR: SCORE: 1230.71

## 2020-05-21 NOTE — LETTER
Transition Communication Hand-off for Care Transitions to Next Level of Care Provider    Name: Avery Arteaga  : 1945  MRN #: [id number]    Key Recommendations:  CTS identifies patient as high risk due to elevated DAVID score. Currently admitted for Gastroenteritis, this is her 4th admission in 6 months. Per chart review, pt resides at home in an apartment alone. Baseline mobility is independent. She is currently a standby assist.     Her PMH that can effect her DAVID score includes COPD, HTN & rheumatoid arthritis. Her PTA medications that can effect her DAVID score include ASA, Gabapentin, Naproxen, methotrexate & Arava.        Josie Enriquez RN, BSN, CPHN, CM    AVS/Discharge Summary is the source of truth; this is a helpful guide for improved communication of patient story

## 2020-05-21 NOTE — ED PROVIDER NOTES
History     Chief Complaint:  Nausea, Vomiting, Diarrhea; Generalized Weakness    HPI   Avery Arteaga is a 75 year old female PMH colon CA s/p hemicolectomy on chemotherapy who presents with vomiting/diarrhea/fatigue. She notes since the onset of chemotherapy having vomiting and diarrhea daily. Her vomiting has slowly improved to 2-3 times a day however the diarrhea is essentially constant. She has had reduced oral intake due to her symptoms. She was not aware until today but has R sided abdominal pain as well. Denies fever, chills, sweats, congestion, rhinorrhea, sore throat, coughing, wheezing, chest pain, SOB, dark or bloody vomitus or stool, dysuria, frequency, anuria, hematuria, rash, peripheral edema.     Allergies:  No known drug allergies    Medications:    Albuterol  Aspirin 81 mg  Atenolol  Vitamin D3  Trelegy Ellipta  Gabapentin  Duoneb  Arava  Lisinopril  Methotrexate  Naproxen     Past Medical History:    COPD  HTN  Rheumatoid arthritis  Cancer of right colon    Past Surgical History:    Right middle finger RA nodule removal  Colectomy    Family History:    History reviewed. No pertinent family history.     Social History:  Smoking status: former smoker  Alcohol use: no  Drug use: no  The patient presents to the emergency department by herself.  PCP: Althea Rai  Marital Status:       Review of Systems   Constitutional: Positive for fatigue. Negative for chills, diaphoresis and fever.   HENT: Negative for congestion, rhinorrhea and sore throat.    Respiratory: Negative for cough, shortness of breath and wheezing.    Cardiovascular: Negative for chest pain and leg swelling.   Gastrointestinal: Positive for abdominal pain, diarrhea and vomiting. Negative for blood in stool and rectal pain.   Genitourinary: Negative for dysuria, frequency and hematuria.   Skin: Negative for rash.   All other systems reviewed and are negative.    Physical Exam     Patient Vitals for the past 24 hrs:   BP Temp  Temp src Pulse Heart Rate Resp SpO2   05/21/20 1630 -- -- -- -- 86 30 --   05/21/20 1615 (!) 112/99 -- -- 81 77 18 --   05/21/20 1600 99/63 -- -- 74 79 16 --   05/21/20 1545 117/64 -- -- 83 72 18 --   05/21/20 1530 -- -- -- -- 78 27 --   05/21/20 1515 104/58 -- -- 69 71 20 --   05/21/20 1500 -- -- -- -- 67 25 99 %   05/21/20 1445 107/57 -- -- -- -- -- --   05/21/20 1418 (!) 88/57 97.5  F (36.4  C) Oral 80 -- 20 100 %     Physical Exam  Vitals signs and nursing note reviewed.   Constitutional:       General: She is not in acute distress.     Appearance: She is not diaphoretic.   HENT:      Head: Normocephalic and atraumatic.      Mouth/Throat:      Pharynx: No oropharyngeal exudate.   Eyes:      General: No scleral icterus.     Extraocular Movements: Extraocular movements intact.   Cardiovascular:      Rate and Rhythm: Normal rate and regular rhythm.      Pulses: Normal pulses.      Heart sounds: Normal heart sounds.   Pulmonary:      Effort: Pulmonary effort is normal. No respiratory distress.      Breath sounds: Normal breath sounds.   Abdominal:      General: Bowel sounds are normal. There is no distension.      Palpations: Abdomen is soft.      Tenderness: There is abdominal tenderness in the right lower quadrant and suprapubic area. There is no guarding or rebound.   Musculoskeletal:         General: No tenderness.   Skin:     General: Skin is warm.      Findings: No rash.   Neurological:      Mental Status: She is alert.       Emergency Department Course   Imaging:  Radiographic findings were communicated with the patient who voiced understanding of the findings.  CT Abdomen pelvis with contrast:   IMPRESSION:   1.  Moderate fluid distention of the distal colon correlate for diarrheal illness.   2.  Interval right hemicolectomy. as per radiology.    Laboratory:  CBC: WBC: 4.6, HGB: 12.2, PLT: 323  CMP: Glucose 115 (H), Potassium 3.1 (L), Urea Nitrogen 39 (H), GFR 26 (L), Albumin 2.6 (L), Protein total 6.2 (L),  o/w WNL (Creatinine: 1.83 (H))  Lipase: 90  UA: not received, o/w Negative  Clostridium Difficile toxin B PCR: Pending  Interventions:  1532 NS 2000 mL IV  1754 potassium chloride 20 mEq Oral    Emergency Department Course:  Nursing notes and vitals reviewed. (1441) I performed an exam of the patient as documented above.     IV inserted. Medicine administered as documented above. Blood drawn. Urine and stool sample obtained. This was sent to the lab for further testing, results above.     The patient was sent for a CT while in the emergency department, findings above.     1850 I rechecked the patient and discussed the results of her workup thus far.       I consulted with Dr. Perez of the hospitalist services. He  is in agreement to accept the patient for admission.    Findings and plan explained to the Patient who consents to admission. Discussed the patient with Dr. Perez, who will admit the patient to a observation bed for further monitoring, evaluation, and treatment.      Impression & Plan    Medical Decision Making:  She presents for evaluation of profound vomiting and diarrhea. She voices a temporal correlation of her symptoms with initiation of chemotherapy. There is high suspicion this is the likely etiology of her complaint. She presents with marked hypotension and R sided abdominal pain. There is concern for potential intra abdominal catastrophe related to her recent surgical intervention. Basic diagnostic labs, volume resuscitation, and CT imaging obtained to further evaluate her complaint. Ultimately her diagnostic labs demonstrate mild hypokalemia, acute kidney injury, but were otherwise unremarkable. C diff testing added again to ensure adequate evaluation for such. CT imaging was reassuring at this time. Plan for admission/observation to ensure improvement of her metabolic changes.     Critical Care time:  none    Diagnosis:    ICD-10-CM    1. LESA (acute kidney injury) (H)  N17.9    2. Hypokalemia   E87.6    3. Diarrhea  R19.7        Disposition:  Admitted to observation  Abdiaziz Joiner  5/21/2020   [facility] EMERGENCY DEPARTMENT  Scribe Disclosure:  I, Abdiaziz Joiner, am serving as a scribe at 2:41 PM on 5/21/2020 to document services personally performed by Lloyd Bustos PA based on my observations and the provider's statements to me.        Lloyd Bustos PA-C  05/21/20 2014

## 2020-05-21 NOTE — ED TRIAGE NOTES
Pt presents for eval from [facility] for eval for having N/V/D following chemo tx. Pt was sent for further eval. Pt appears very weak in triage. Pt is A&O, ABC's intact.

## 2020-05-22 VITALS
RESPIRATION RATE: 16 BRPM | DIASTOLIC BLOOD PRESSURE: 64 MMHG | HEIGHT: 65 IN | TEMPERATURE: 97.9 F | BODY MASS INDEX: 26.99 KG/M2 | HEART RATE: 81 BPM | SYSTOLIC BLOOD PRESSURE: 110 MMHG | OXYGEN SATURATION: 95 % | WEIGHT: 162 LBS

## 2020-05-22 LAB
ANION GAP SERPL CALCULATED.3IONS-SCNC: 6 MMOL/L (ref 3–14)
BUN SERPL-MCNC: 37 MG/DL (ref 7–30)
C DIFF TOX B STL QL: NEGATIVE
CALCIUM SERPL-MCNC: 7.2 MG/DL (ref 8.5–10.1)
CHLORIDE SERPL-SCNC: 118 MMOL/L (ref 94–109)
CO2 SERPL-SCNC: 18 MMOL/L (ref 20–32)
CREAT SERPL-MCNC: 1.25 MG/DL (ref 0.52–1.04)
ERYTHROCYTE [DISTWIDTH] IN BLOOD BY AUTOMATED COUNT: 16.2 % (ref 10–15)
GFR SERPL CREATININE-BSD FRML MDRD: 42 ML/MIN/{1.73_M2}
GLUCOSE SERPL-MCNC: 86 MG/DL (ref 70–99)
HCT VFR BLD AUTO: 32.6 % (ref 35–47)
HGB BLD-MCNC: 10.1 G/DL (ref 11.7–15.7)
MCH RBC QN AUTO: 31.6 PG (ref 26.5–33)
MCHC RBC AUTO-ENTMCNC: 31 G/DL (ref 31.5–36.5)
MCV RBC AUTO: 102 FL (ref 78–100)
PLATELET # BLD AUTO: 219 10E9/L (ref 150–450)
POTASSIUM SERPL-SCNC: 3.6 MMOL/L (ref 3.4–5.3)
RBC # BLD AUTO: 3.2 10E12/L (ref 3.8–5.2)
SODIUM SERPL-SCNC: 142 MMOL/L (ref 133–144)
SPECIMEN SOURCE: NORMAL
WBC # BLD AUTO: 4.4 10E9/L (ref 4–11)

## 2020-05-22 PROCEDURE — 25800030 ZZH RX IP 258 OP 636: Performed by: INTERNAL MEDICINE

## 2020-05-22 PROCEDURE — 25000132 ZZH RX MED GY IP 250 OP 250 PS 637: Performed by: INTERNAL MEDICINE

## 2020-05-22 PROCEDURE — 85027 COMPLETE CBC AUTOMATED: CPT | Performed by: INTERNAL MEDICINE

## 2020-05-22 PROCEDURE — 80048 BASIC METABOLIC PNL TOTAL CA: CPT | Performed by: INTERNAL MEDICINE

## 2020-05-22 PROCEDURE — 36415 COLL VENOUS BLD VENIPUNCTURE: CPT | Performed by: INTERNAL MEDICINE

## 2020-05-22 PROCEDURE — G0378 HOSPITAL OBSERVATION PER HR: HCPCS

## 2020-05-22 PROCEDURE — 99217 ZZC OBSERVATION CARE DISCHARGE: CPT | Performed by: PHYSICIAN ASSISTANT

## 2020-05-22 RX ADMIN — POTASSIUM CHLORIDE 40 MEQ: 1500 TABLET, EXTENDED RELEASE ORAL at 02:44

## 2020-05-22 RX ADMIN — SODIUM CHLORIDE, PRESERVATIVE FREE: 5 INJECTION INTRAVENOUS at 05:01

## 2020-05-22 NOTE — PLAN OF CARE
PRIMARY DIAGNOSIS: GASTROENTERITIS     OUTPATIENT/OBSERVATION GOALS TO BE MET BEFORE DISCHARGE  1. Orthostatic performed: N/A    2. Tolerating PO fluid and/or antibiotics (if applicable): N/A    3. Nausea/Vomiting/Diarrhea symptoms improved: Nausea improved. Loose stool x 1 this morning    4. Pain status: Pain free.    5. Return to near baseline physical activity: Yes    Pt. Had two loose stools overnight, 1 loose stool this morning.  Pt. Reports she ate tomato soup prior to her loose stool. Pt. Up Ind in room.  NS at 125mL/hr    Discharge Planner Nurse   Safe discharge environment identified: Yes  Barriers to discharge: Yes       Entered by: Matteo Newby 05/22/2020 12:31 PM

## 2020-05-22 NOTE — ED NOTES
[facility]  ED Nurse Handoff Report    Avery Arteaga is a 75 year old female   ED Chief complaint: No chief complaint on file.  . ED Diagnosis:   Final diagnoses:   LESA (acute kidney injury) (H)   Hypokalemia   Diarrhea     Allergies: No Known Allergies    Code Status: Full Code  Activity level - Baseline/Home:  Independent. Activity Level - Current:   Assist X 1. Lift room needed: No. Bariatric: No   Needed: No   Isolation: Yes. Infection: Not Applicable  C-Diff Pending.     Vital Signs:   Vitals:    05/21/20 1545 05/21/20 1600 05/21/20 1615 05/21/20 1630   BP: 117/64 99/63 (!) 112/99    Pulse: 83 74 81    Resp: 18 16 18 30   Temp:       TempSrc:       SpO2:           Cardiac Rhythm:  ,      Pain level:    Patient confused: No. Patient Falls Risk: Yes.   Elimination Status: Has voided   Patient Report - Initial Complaint: diarrhea x 3 weeks. Focused Assessment: patient has had diarrhea frequently x 3 weeks, intermittent nausea and vomiting x 3 weeks.  Symptoms began after beginning chemo treatment.  Was seen at oncology clinic on Tuesday, received fluids then.  Today called oncology clinic again, was told to come to ED.  ABCDs intact.     Tests Performed: labs, CT. Abnormal Results:   Labs Ordered and Resulted from Time of ED Arrival Up to the Time of Departure from the ED   CBC WITH PLATELETS DIFFERENTIAL - Abnormal; Notable for the following components:       Result Value    RDW 16.5 (*)     Absolute Lymphocytes 0.7 (*)     All other components within normal limits   COMPREHENSIVE METABOLIC PANEL - Abnormal; Notable for the following components:    Potassium 3.1 (*)     Glucose 115 (*)     Urea Nitrogen 39 (*)     Creatinine 1.83 (*)     GFR Estimate 26 (*)     GFR Estimate If Black 31 (*)     Albumin 2.6 (*)     Protein Total 6.2 (*)     All other components within normal limits   LIPASE   UA MACROSCOPIC WITH REFLEX TO MICRO AND CULTURE   CLOSTRIDIUM DIFFICILE TOXIN B   .   Treatments  provided: fluids, PO potassium, monitoring  Family Comments: patient has called her family  OBS brochure/video discussed/provided to patient:  Yes  ED Medications:   Medications   iohexol (OMNIPAQUE) solution 25 mL (has no administration in time range)   sodium chloride 0.9% infusion (has no administration in time range)   0.9% sodium chloride BOLUS (0 mLs Intravenous Stopped 5/21/20 1900)   potassium chloride ER (KLOR-CON M) CR tablet 20 mEq (20 mEq Oral Given 5/21/20 1754)     Drips infusing:  No  For the majority of the shift, the patient's behavior Green. Interventions performed were n/a.    Sepsis treatment initiated: No       ED Nurse Name/Phone Number: Cody Goldman RN,   8:02 PM  *[phone]

## 2020-05-22 NOTE — DISCHARGE SUMMARY
[facility] Observation Unit  Discharge Summary        Avery Arteaga MRN# [id number]   YOB: 1945 Age: 75 year old     Date of Admission:  5/21/2020  Date of Discharge:  5/22/2020  Admitting Physician:  Susan Perez MD, MD  Discharge Physician: Maryellen Blanton PA-C  Discharging Service: Hospitalist      Primary Provider: Althea Rai  Primary Care Physician Phone Number: [phone]         Primary Discharge Diagnoses:    Avery Arteaga was admitted on 5/21/2020 for concerns of persistent diarrhea and vomiting.      1. Persistent diarrhea - likely chemotherapy related. Hx of colon ca, s/p partial colon resection on 4/7 then started chemo, one month later. Diarrhea since. Stool sample collected and negative for c diff. Tolerating full liquids, continuing to have diarrhea. Recommend restarting antidiarrheal. Pt does not want to resume chemo until diarrhea resolves, or if at all. Recommended pt follow up with oncology to discuss options, including stopping all treatments and IV hydration at infusion clinic. Continue to advance diet as tolerated. Push fluids at home.   2. Hypokalemia - due to persistent diarrhea and poor appetite. Replaced per protocol and resolved. Advance diet as tolerated at home  3. Acute on CKD - baseline Cr ~1.0-1.2 since surgery. Cr 1.83 on admission, improved to 1.25 after IVFs. Continue to push fluids at home.   4. Colon adenocarcinoma - s/p right hemicolectomy. Completed one cycle of chemotherapy, has had diarrhea and poor appetite since. Pt does not wish to continue with chemotherapy at this time. My choose to not continue chemo at all. Recommend that she follow up with oncology to discuss all treatment options before making final decision.         Secondary Discharge Diagnoses:     Past Medical History:   Diagnosis Date     COPD (chronic obstructive pulmonary disease) (H)      Hypertension      Rheumatoid arthritis (H)                 Code Status:      DNR /  DNI        Brief Hospital Summary:        Reason for your hospital stay      Persistent diarrhea and low potassium. Stool sample collected and was   negative for c diff. Ok to resume antidiarrheals. Potassium was replaced   per protocol. You tolerated full liquids, diarrhea persisted but you felt   you could manage this at home. Follow up with Oncology to discuss goals of   care and further treatments.             Please refer to initial admission history and physical for further details.   Briefly, Avery Arteaga was admitted on 5/21/2020 for concerns of persistent diarrhea and vomiting.  Work up in ED did not show any evidence of bowel obstruction. Labs showed hypokalemia. CT abd/pelvis showed moderate fluid distension in distal colon concerning for diarrheal illness. Pt was registered to the Observation Unit for continued supportive therapy.     Pt was resuscitated with IV fluids and continued on supportive measures including anti-emetics and pain control as needed. Labs were reviewed and significant results addressed.  On the day of discharge, symptoms remained stable and c diff was negative. Pt was able to tolerate PO intake. Vitals were stable, without evidence of orthostasis. Medications were reviewed and adjustments made as necessary. Pt is instructed to follow up as below.            Significant Lab During Hospitalization:      Recent Labs   Lab 05/22/20  0537 05/21/20  1535   WBC 4.4 4.6   HGB 10.1* 12.2   HCT 32.6* 38.6   * 100    323     Recent Labs   Lab 05/22/20  0537 05/21/20  2203 05/21/20  1535     --  139   POTASSIUM 3.6 2.9* 3.1*   CHLORIDE 118*  --  108   CO2 18*  --  23   ANIONGAP 6  --  8   GLC 86  --  115*   BUN 37*  --  39*   CR 1.25*  --  1.83*   GFRESTIMATED 42*  --  26*   GFRESTBLACK 49*  --  31*   NARESH 7.2*  --  8.6   MAG  --   --  2.3   PROTTOTAL  --   --  6.2*   ALBUMIN  --   --  2.6*   BILITOTAL  --   --  0.6   ALKPHOS  --   --  57   AST  --   --  18   ALT  --   --   13     Recent Labs   Lab 05/21/20  1535   LIPASE 90                Significant Imaging During Hospitalization:      Recent Results (from the past 48 hour(s))   CT Abdomen Pelvis w/o Contrast    Narrative    EXAM: CT ABDOMEN PELVIS W/O CONTRAST  LOCATION: [facility]  DATE/TIME: 5/21/2020 5:32 PM    INDICATION: Abd pain, acute, generalized Abd pain, acute, generalized; abdominal pain, recent hemicolectomy, vomiting/diarrhea, cancer;  COMPARISON: PET/CT 02/11/2020.  TECHNIQUE: CT scan of the abdomen and pelvis was performed without IV contrast. Multiplanar reformats were obtained. Dose reduction techniques were used.  CONTRAST: None.    FINDINGS:   LOWER CHEST: Normal.    HEPATOBILIARY: There are couple of small subcentimeter calcified gallstones. Liver unremarkable.    PANCREAS: Normal.    SPLEEN: Normal.    ADRENAL GLANDS: Normal.    KIDNEYS/BLADDER: No significant mass, stone, or hydronephrosis.    BOWEL: Moderate amount of fluid in the distal colon correlate for diarrheal illness. No evidence of bowel obstruction. There is mild mesenteric edema within the lower abdomen.     LYMPH NODES: Normal.    VASCULATURE: Mild infrarenal abdominal aortic ectasia up to 2.9 cm.    PELVIC ORGANS: Uterine mass is seen on prior exam not well evaluated on noncontrast CT.    MUSCULOSKELETAL: No suspicious bone lesion.      Impression    IMPRESSION:   1.  Moderate fluid distention of the distal colon correlate for diarrheal illness.  2.  Interval right hemicolectomy.                  Pending Results:        Unresulted Labs Ordered in the Past 30 Days of this Admission     No orders found for last 31 day(s).              Consultations This Hospital Stay:      No consultations were requested during this admission         Discharge Instructions and Follow-Up:      Follow-up Appointments     Follow-up and recommended labs and tests       Follow up with primary care provider, Althea Rai, within 7 days for   hospital  follow- up.  No follow up labs or test are needed.  Follow up with oncology to discuss goals of care and further treatments.   Encouraged oral hydration.           Pt instructed to follow up with PCP in 7 days and with Consultant if indicated.   Follow-up Labs None        Discharge Disposition:      Discharged to home         Discharge Medications:        Current Discharge Medication List      CONTINUE these medications which have NOT CHANGED    Details   aspirin 81 MG EC tablet Take 81 mg by mouth daily      atenolol (TENORMIN) 25 MG tablet Take 25 mg by mouth daily      Cholecalciferol (VITAMIN D3) 25 MCG (1000 UT) CAPS Take 2,000 Units by mouth daily      Fluticasone-Umeclidin-Vilanterol (TRELEGY ELLIPTA) 100-62.5-25 MCG/INH oral inhaler Inhale 1 puff into the lungs daily      gabapentin (NEURONTIN) 300 MG capsule Take 300 mg by mouth At Bedtime      leflunomide (ARAVA) 10 MG tablet Take 10 mg by mouth daily      lisinopril (ZESTRIL) 20 MG tablet Take 20 mg by mouth daily       multivitamin  with lutein (OCUVITE WITH LTEIN) CAPS per capsule Take 1 capsule by mouth daily      naproxen sodium (ANAPROX) 220 MG tablet Take 220 mg by mouth 2 times daily as needed for moderate pain or headaches      albuterol (PROAIR HFA/PROVENTIL HFA/VENTOLIN HFA) 108 (90 Base) MCG/ACT inhaler Inhale 2 puffs into the lungs every 4 hours as needed    Comments: Pharmacy may dispense brand covered by insurance (Proair, or proventil or ventolin or generic albuterol inhaler)      fa-pyridoxine-cyancobalamin 2.5-25-2 MG TABS per tablet Take 1 tablet by mouth daily      methotrexate 2.5 MG tablet Take 9 tablets (22.5 mg) by mouth every 7 days On Mondays.  Skip Monday April 13 and resume on Monday April 20  Qty:      Associated Diagnoses: Cancer of right colon (H)                 Allergies:       No Known Allergies        Condition and Physical on Discharge:      Discharge condition: Stable   Vitals: Blood pressure 100/63, pulse 81,  "temperature 96.5  F (35.8  C), temperature source Axillary, resp. rate 16, height 1.651 m (5' 5\"), weight 73.5 kg (162 lb), SpO2 98 %.  162 lbs 0 oz      GENERAL:  Comfortable.  PSYCH: pleasant, oriented, No acute distress.  HEART:  RRR.  LUNGS:  Normal Respiratory effort.   EXTREMITIES:  Able to ambulate independently.  SKIN:  Dry to touch, No rash, wound or ulcerations.  NEUROLOGIC:  Grossly intact    Maryellen Blanton PA-C   "

## 2020-05-22 NOTE — H&P
Admitted:     05/21/2020      CHIEF COMPLAINT:  Nausea, vomiting, diarrhea, weakness.      HISTORY OF PRESENT ILLNESS:  This is a 75-year-old pleasant white female with a history of COPD, rheumatoid arthritis, hypertension, restless leg syndrome, recent diagnosis of colon cancer who is status post right hemicolectomy.  This was carried out on 04/07/2020.  Since the onset of her hemicolectomy, she has had some diarrhea.  However, she has since initiated chemotherapy and follows with Dr. Gonzalez at [facility] and her diarrhea has gotten significantly worse.  It is watery, voluminous and at least a dozen times a day.  She has also had emesis.  She denies any blood in her stool or her emesis.  She denies any fevers or chills.  She does complain of some abdominal cramping.  She was referred to come into the ER from [facility] to make sure there is no underlying bowel obstruction.  In the ER over here, she was seen by Lloyd Bustos, the PA.  I discussed care with him.  I am asked to admit her for further evaluation.  The patient denies any fevers, chills, chest pain, shortness of breath.      PAST MEDICAL HISTORY:  Significant for COPD, rheumatoid arthritis, hypertension, restless leg syndrome, and colon cancer.      PAST SURGICAL HISTORY:  Significant for CT-guided left upper lung lobe biopsy in 03/2020 which was complicated with pneumothorax, laparoscopic right hemicolectomy.      FAMILY HISTORY:  Reviewed and noncontributory.      SOCIAL HISTORY:  She used to be a former smoker but quit since January.  She does not drink alcohol.      MEDICATIONS:  Her medication list is awaiting reconciliation.      REVIEW OF SYSTEMS:  As mentioned in the HPI.  All other systems were extensively reviewed and deemed unremarkable and negative.      PHYSICAL EXAMINATION:   VITAL SIGNS:  Temperature is 97.5, pulse 86, blood pressure is 112/99, respiratory rate is 20, O2 sat 99% on room air.   GENERAL:  She is alert, awake,  oriented, coherent, in no acute distress.   HEENT:  Pupils equal, round, react to light.   LUNGS:  Clear to auscultation bilaterally.   HEART:  Regular rate, S1, S2 normal.  No murmurs or gallops.   ABDOMEN:  Soft, nontender, nondistended, with good bowel sounds.  She has mild scattered ecchymoses.   EXTREMITIES:  There is no edema.   SKIN:  There is no rash.   NEUROLOGIC:  She moves all extremities.      LABORATORY DATA:  Lab work obtained here included a CMP which is grossly unremarkable other than a potassium of 3.1.  Her BUN is 39 and creatinine is 1.83 with a GFR of 26, albumin 2.6, total protein 6.2.  Her LFTs are within normal limits.  Lipase is within normal limits.  CBC with diff is grossly unremarkable.      A CT of the abdomen and pelvis carried out over here showed moderate fluid distention of the distal colon, correlate for diarrheal illness, interval right hemicolectomy.      ASSESSMENT AND PLAN:   1.  Acute gastroenteritis:  We will admit here under observation status.  We will hydrate her with IV fluids.  We will send off her stool for C. diff; however, I suspect that her diarrhea is likely due to her chemotherapy.   2.  Acute on chronic renal failure:  She has baseline CKD stage III.  We will hydrate with IV fluids, avoid nephrotoxins.   3.  Hypokalemia:  We will replace her potassium.      CODE STATUS:  DNR/DNI.      She will be admitted under observation status.         APRIL HAGEN MD             D: 2020   T: 2020   MT:       Name:     SARY CRAWFORD   MRN:      [id number]        Account:      [id number]   :      1945        Admitted:     2020                   Document: [id number]       cc: Althea Rai DO

## 2020-05-22 NOTE — PROGRESS NOTES
Patient's After Visit Summary was reviewed with patient   Patient verbalized understanding of After Visit Summary, recommended follow up and was given an opportunity to ask questions.   Discharge medications sent home with patient/family: YES, Not applicable   Discharged with friend     OBSERVATION patient END time: 6417

## 2020-05-22 NOTE — PLAN OF CARE
PRIMARY DIAGNOSIS: GASTROENTERITIS     OUTPATIENT/OBSERVATION GOALS TO BE MET BEFORE DISCHARGE  1. Orthostatic performed: N/A    2. Tolerating PO fluid and/or antibiotics (if applicable): N/A    3. Nausea/Vomiting/Diarrhea symptoms improved: Nausea improved. Loose stool x 1 this morning    4. Pain status: Pain free.    5. Return to near baseline physical activity: Yes    Pt. Reporting she wants to discharge and manage diarrhea at home.  Pt. Had one more loose stool this morning.     Discharge Planner Nurse   Safe discharge environment identified: Yes  Barriers to discharge: Yes       Entered by: Matteo Newby 05/22/2020 12:41 PM

## 2020-05-22 NOTE — PLAN OF CARE
PRIMARY DIAGNOSIS: GASTROENTERITIS     OUTPATIENT/OBSERVATION GOALS TO BE MET BEFORE DISCHARGE  1. Orthostatic performed: N/A    2. Tolerating PO fluid and/or antibiotics (if applicable): Yes, clears    3. Nausea/Vomiting/Diarrhea symptoms improved: One loose stool on admission    4. Pain status: Pain free.    5. Return to near baseline physical activity: Yes    Discharge Planner Nurse   Safe discharge environment identified: Yes  Barriers to discharge: Yes       Entered by: Andrew Mendoza 05/22/2020 1:46 AM     Vitals are Temp: 97.7  F (36.5  C) Temp src: Oral BP: 112/54 Pulse: 81 Heart Rate: 65 Resp: 20 SpO2: 96 %.  Patient is alert and oriented x4. Up independently in room.  Patient is on Enteric precuations for diarrhea and awaiting stool sample results.  Tolerating clear liquid diet. Denies pain. NS at 100 mL/hr. Denies SOB, dizziness, and nausea. CMS intact. K+ recheck at 2.9; replacing orally. Will continue to monitor.          Please review provider order for any additional goals.   Nurse to notify provider when observation goals have been met and patient is ready for discharge.

## 2020-05-22 NOTE — ED NOTES
[facility]  ED Nurse Handoff Report    Avery Arteaga is a 75 year old female   ED Chief complaint: No chief complaint on file.  . ED Diagnosis:   Final diagnoses:   LESA (acute kidney injury) (H)   Hypokalemia   Diarrhea     Allergies: No Known Allergies    Code Status: Full Code  Activity level - Baseline/Home:  Independent. Activity Level - Current:   Assist X 1. Lift room needed: No. Bariatric: No   Needed: No   Isolation: Yes. Infection: Not Applicable  C-Diff Pending.     Vital Signs:   Vitals:    05/21/20 1545 05/21/20 1600 05/21/20 1615 05/21/20 1630   BP: 117/64 99/63 (!) 112/99    Pulse: 83 74 81    Resp: 18 16 18 30   Temp:       TempSrc:       SpO2:           Cardiac Rhythm:  ,      Pain level:    Patient confused: No. Patient Falls Risk: Yes.   Elimination Status: Has voided   Patient Report - Initial Complaint: diarrhea x 3 weeks. Focused Assessment: patient has had diarrhea frequently x 3 weeks, intermittent nausea and vomiting x 3 weeks.  Symptoms began after beginning chemo treatment.  Was seen at oncology clinic on Tuesday, received fluids then.  Today called oncology clinic again, was told to come to ED.  ABCDs intact.     Tests Performed: labs, CT. Abnormal Results:   Labs Ordered and Resulted from Time of ED Arrival Up to the Time of Departure from the ED   CBC WITH PLATELETS DIFFERENTIAL - Abnormal; Notable for the following components:       Result Value    RDW 16.5 (*)     Absolute Lymphocytes 0.7 (*)     All other components within normal limits   COMPREHENSIVE METABOLIC PANEL - Abnormal; Notable for the following components:    Potassium 3.1 (*)     Glucose 115 (*)     Urea Nitrogen 39 (*)     Creatinine 1.83 (*)     GFR Estimate 26 (*)     GFR Estimate If Black 31 (*)     Albumin 2.6 (*)     Protein Total 6.2 (*)     All other components within normal limits   LIPASE   UA MACROSCOPIC WITH REFLEX TO MICRO AND CULTURE   CLOSTRIDIUM DIFFICILE TOXIN B   .   Treatments  provided: fluids, PO potassium, monitoring  Family Comments: patient has called her family  OBS brochure/video discussed/provided to patient:  Yes  ED Medications:   Medications   iohexol (OMNIPAQUE) solution 25 mL (has no administration in time range)   sodium chloride 0.9% infusion (has no administration in time range)   0.9% sodium chloride BOLUS (0 mLs Intravenous Stopped 5/21/20 1900)   potassium chloride ER (KLOR-CON M) CR tablet 20 mEq (20 mEq Oral Given 5/21/20 1754)     Drips infusing:  No  For the majority of the shift, the patient's behavior Green. Interventions performed were n/a.    Sepsis treatment initiated: No       ED Nurse Name/Phone Number: Cody Goldman RN,   8:02 PM  *[phone]    RECEIVING UNIT ED HANDOFF REVIEW    Above ED Nurse Handoff Report was reviewed: Yes  Reviewed by: Andrew Mendoza on May 21, 2020 at 8:51 PM

## 2020-05-22 NOTE — PLAN OF CARE
PRIMARY DIAGNOSIS: GASTROENTERITIS     OUTPATIENT/OBSERVATION GOALS TO BE MET BEFORE DISCHARGE  1. Orthostatic performed: N/A    2. Tolerating PO fluid and/or antibiotics (if applicable): Yes, clears    3. Nausea/Vomiting/Diarrhea symptoms improved: Nausea improved. Loose stool x2.    4. Pain status: Pain free.    5. Return to near baseline physical activity: Yes    Discharge Planner Nurse   Safe discharge environment identified: Yes  Barriers to discharge: Yes       Entered by: Andrew Mendoza 05/22/2020 5:15 AM     Vitals are Temp: 95.8  F (35.4  C) Temp src: Oral BP: 102/57 Pulse: 81 Heart Rate: 96 Resp: 16 SpO2: 97 %.  Patient is alert and oriented x4. Up independently in room. Tolerating clear liquid diet. Denies pain. NS at 125 mL/hr. Denies SOB, dizziness, and nausea. CMS intact. C. Diff. Negative. K+ recheck at 2.9; replaced orally. Lab recheck in AM. Will continue to monitor and provide supportive cares.         Please review provider order for any additional goals.   Nurse to notify provider when observation goals have been met and patient is ready for discharge.

## 2020-05-22 NOTE — PHARMACY-ADMISSION MEDICATION HISTORY
Admission medication history interview status for this patient is complete. See [facility] admission navigator for allergy information, prior to admission medications and immunization status.     Medication history interview done via telephone during Covid-19 pandemic, indicate source(s): Patient  Medication history resources (including written lists, pill bottles, clinic record): SureScripts and Care Everywhere    Changes made to PTA medication list:  Added: none  Deleted: Duoneb, acetaminophen, enoxaparin  Changed: none    Actions taken by pharmacist (provider contacted, etc): Called patient to verify med list.      Additional medication history information: Patient's fa-pyridoxine-B12 and methotrexate are on hold currently.    Medication reconciliation/reorder completed by provider prior to medication history?  N      Prior to Admission medications    Medication Sig Last Dose Taking? Auth Provider   aspirin 81 MG EC tablet Take 81 mg by mouth daily 5/21/2020 at Unknown time Yes Unknown, Entered By History   atenolol (TENORMIN) 25 MG tablet Take 25 mg by mouth daily 5/21/2020 at Unknown time Yes Unknown, Entered By History   Cholecalciferol (VITAMIN D3) 25 MCG (1000 UT) CAPS Take 2,000 Units by mouth daily 5/21/2020 at Unknown time Yes Unknown, Entered By History   Fluticasone-Umeclidin-Vilanterol (TRELEGY ELLIPTA) 100-62.5-25 MCG/INH oral inhaler Inhale 1 puff into the lungs daily 5/21/2020 at Unknown time Yes Unknown, Entered By History   gabapentin (NEURONTIN) 300 MG capsule Take 300 mg by mouth At Bedtime 5/20/2020 at Unknown time Yes Reported, Patient   leflunomide (ARAVA) 10 MG tablet Take 10 mg by mouth daily 5/21/2020 at Unknown time Yes Unknown, Entered By History   lisinopril (ZESTRIL) 20 MG tablet Take 20 mg by mouth daily  5/21/2020 at Unknown time Yes Unknown, Entered By History   multivitamin  with lutein (OCUVITE WITH LTEIN) CAPS per capsule Take 1 capsule by mouth daily 5/21/2020 at Unknown time Yes  Unknown, Entered By History   naproxen sodium (ANAPROX) 220 MG tablet Take 220 mg by mouth 2 times daily as needed for moderate pain or headaches Past Month at Unknown time Yes Unknown, Entered By History   albuterol (PROAIR HFA/PROVENTIL HFA/VENTOLIN HFA) 108 (90 Base) MCG/ACT inhaler Inhale 2 puffs into the lungs every 4 hours as needed More than a month at Unknown time  Unknown, Entered By History   fa-pyridoxine-cyancobalamin 2.5-25-2 MG TABS per tablet Take 1 tablet by mouth daily More than a month at Unknown time  Unknown, Entered By History   methotrexate 2.5 MG tablet Take 9 tablets (22.5 mg) by mouth every 7 days On Mondays.  Skip Monday April 13 and resume on Monday April 20 More than a month at Unknown time  Haydee Sneed PA-C

## 2020-05-22 NOTE — PLAN OF CARE
ROOM # 229    Living Situation (if not independent, order SW consult): Apt  Facility name: [facility]  : Carlos Manuel, Son  Shanna, daughter      Activity level at baseline: Ind  Activity level on admit: Ind      Patient registered to observation; given Patient Bill of Rights; given the opportunity to ask questions about observation status and their plan of care.  Patient has been oriented to the observation room, bathroom and call light is in place.    Discussed discharge goals and expectations with patient/family.

## 2020-05-22 NOTE — CONSULTS
CTS identifies patient as high risk due to elevated DAVID score. Currently admitted for Gastroenteritis, this is her 4th admission in 6 months. Per chart review, pt resides at home in an apartment alone. Baseline mobility is independent. She is currently a standby assist.     Her PMH that can effect her DAVID score includes COPD, HTN & rheumatoid arthritis. Her PTA medications that can effect her DAVID score include ASA, Gabapentin, Naproxen, methotrexate & Arava.      Will follow along with SW for DC planning. Will give hand off to clinic RN CTS at time of discharge. .      Shadia Enriquez RN, BSN, CPHN, CTS  Inpatient Care Coordinator  [facility]  [phone]

## 2021-03-22 DIAGNOSIS — Z11.59 ENCOUNTER FOR SCREENING FOR OTHER VIRAL DISEASES: ICD-10-CM

## 2021-04-08 DIAGNOSIS — Z11.59 ENCOUNTER FOR SCREENING FOR OTHER VIRAL DISEASES: ICD-10-CM

## 2021-04-08 LAB
SARS-COV-2 RNA RESP QL NAA+PROBE: NORMAL
SPECIMEN SOURCE: NORMAL

## 2021-04-08 PROCEDURE — U0003 INFECTIOUS AGENT DETECTION BY NUCLEIC ACID (DNA OR RNA); SEVERE ACUTE RESPIRATORY SYNDROME CORONAVIRUS 2 (SARS-COV-2) (CORONAVIRUS DISEASE [COVID-19]), AMPLIFIED PROBE TECHNIQUE, MAKING USE OF HIGH THROUGHPUT TECHNOLOGIES AS DESCRIBED BY CMS-2020-01-R: HCPCS | Performed by: COLON & RECTAL SURGERY

## 2021-04-08 PROCEDURE — U0005 INFEC AGEN DETEC AMPLI PROBE: HCPCS | Performed by: COLON & RECTAL SURGERY

## 2021-04-09 LAB
LABORATORY COMMENT REPORT: NORMAL
SARS-COV-2 RNA RESP QL NAA+PROBE: NEGATIVE
SPECIMEN SOURCE: NORMAL

## 2021-04-12 ENCOUNTER — HOSPITAL ENCOUNTER (OUTPATIENT)
Facility: CLINIC | Age: 76
Discharge: HOME | End: 2021-04-12
Attending: COLON & RECTAL SURGERY | Admitting: COLON & RECTAL SURGERY
Payer: COMMERCIAL

## 2021-04-12 VITALS
RESPIRATION RATE: 16 BRPM | OXYGEN SATURATION: 96 % | BODY MASS INDEX: 27.99 KG/M2 | DIASTOLIC BLOOD PRESSURE: 69 MMHG | SYSTOLIC BLOOD PRESSURE: 110 MMHG | HEIGHT: 65 IN | HEART RATE: 80 BPM | WEIGHT: 168 LBS | TEMPERATURE: 97.3 F

## 2021-04-12 LAB — COLONOSCOPY: NORMAL

## 2021-04-12 PROCEDURE — G0105 COLORECTAL SCRN; HI RISK IND: HCPCS | Performed by: COLON & RECTAL SURGERY

## 2021-04-12 PROCEDURE — 45378 DIAGNOSTIC COLONOSCOPY: CPT | Performed by: COLON & RECTAL SURGERY

## 2021-04-12 PROCEDURE — 250N000013 HC RX MED GY IP 250 OP 250 PS 637: Performed by: COLON & RECTAL SURGERY

## 2021-04-12 PROCEDURE — G0500 MOD SEDAT ENDO SERVICE >5YRS: HCPCS | Performed by: COLON & RECTAL SURGERY

## 2021-04-12 PROCEDURE — 250N000011 HC RX IP 250 OP 636: Performed by: COLON & RECTAL SURGERY

## 2021-04-12 RX ORDER — NALOXONE HYDROCHLORIDE 0.4 MG/ML
0.4 INJECTION, SOLUTION INTRAMUSCULAR; INTRAVENOUS; SUBCUTANEOUS
Status: DISCONTINUED | OUTPATIENT
Start: 2021-04-12 | End: 2021-04-12 | Stop reason: HOSPADM

## 2021-04-12 RX ORDER — FENTANYL CITRATE 50 UG/ML
INJECTION, SOLUTION INTRAMUSCULAR; INTRAVENOUS PRN
Status: COMPLETED | OUTPATIENT
Start: 2021-04-12 | End: 2021-04-12

## 2021-04-12 RX ORDER — FLUMAZENIL 0.1 MG/ML
0.2 INJECTION, SOLUTION INTRAVENOUS
Status: DISCONTINUED | OUTPATIENT
Start: 2021-04-12 | End: 2021-04-12 | Stop reason: HOSPADM

## 2021-04-12 RX ORDER — NALOXONE HYDROCHLORIDE 0.4 MG/ML
0.2 INJECTION, SOLUTION INTRAMUSCULAR; INTRAVENOUS; SUBCUTANEOUS
Status: DISCONTINUED | OUTPATIENT
Start: 2021-04-12 | End: 2021-04-12 | Stop reason: HOSPADM

## 2021-04-12 RX ORDER — SIMETHICONE 40MG/0.6ML
SUSPENSION, DROPS(FINAL DOSAGE FORM)(ML) ORAL PRN
Status: COMPLETED | OUTPATIENT
Start: 2021-04-12 | End: 2021-04-12

## 2021-04-12 RX ORDER — ONDANSETRON 2 MG/ML
4 INJECTION INTRAMUSCULAR; INTRAVENOUS
Status: DISCONTINUED | OUTPATIENT
Start: 2021-04-12 | End: 2021-04-12 | Stop reason: HOSPADM

## 2021-04-12 RX ORDER — ONDANSETRON 4 MG/1
4 TABLET, ORALLY DISINTEGRATING ORAL EVERY 6 HOURS PRN
Status: DISCONTINUED | OUTPATIENT
Start: 2021-04-12 | End: 2021-04-12 | Stop reason: HOSPADM

## 2021-04-12 RX ORDER — PROCHLORPERAZINE MALEATE 5 MG
5 TABLET ORAL EVERY 6 HOURS PRN
Status: DISCONTINUED | OUTPATIENT
Start: 2021-04-12 | End: 2021-04-12 | Stop reason: HOSPADM

## 2021-04-12 RX ORDER — LIDOCAINE 40 MG/G
CREAM TOPICAL
Status: DISCONTINUED | OUTPATIENT
Start: 2021-04-12 | End: 2021-04-12 | Stop reason: HOSPADM

## 2021-04-12 RX ORDER — ONDANSETRON 2 MG/ML
4 INJECTION INTRAMUSCULAR; INTRAVENOUS EVERY 6 HOURS PRN
Status: DISCONTINUED | OUTPATIENT
Start: 2021-04-12 | End: 2021-04-12 | Stop reason: HOSPADM

## 2021-04-12 RX ADMIN — Medication 40 MG: at 09:21

## 2021-04-12 RX ADMIN — FENTANYL CITRATE 100 MCG: 50 INJECTION, SOLUTION INTRAMUSCULAR; INTRAVENOUS at 09:06

## 2021-04-12 RX ADMIN — MIDAZOLAM 1 MG: 1 INJECTION INTRAMUSCULAR; INTRAVENOUS at 09:06

## 2021-04-12 ASSESSMENT — MIFFLIN-ST. JEOR: SCORE: 1252.92

## 2021-04-12 NOTE — H&P
Pre-Endoscopy History and Physical     Avery Arteaga MRN# [id number]   YOB: 1945 Age: 76 year old     Date of Procedure: 4/12/2021  Primary care provider: Althea Rai  Type of Endoscopy: colonoscopy  Reason for Procedure: surveillance, colon cancer 2020  Type of Anesthesia Anticipated: Moderate Sedation    HPI:    Avery is a 76 year old female who will be undergoing the above procedure.      A history and physical has been performed. The patient's medications and allergies have been reviewed. The risks and benefits of the procedure and the sedation options and risks were discussed with the patient.  All questions were answered and informed consent was obtained.      She denies a personal or family history of anesthesia complications or bleeding disorders.     No Known Allergies     Prior to Admission Medications   Prescriptions Last Dose Informant Patient Reported? Taking?   Cholecalciferol (VITAMIN D3) 25 MCG (1000 UT) CAPS 4/11/2021 at Unknown time  Yes Yes   Sig: Take 2,000 Units by mouth daily   Fluticasone-Umeclidin-Vilanterol (TRELEGY ELLIPTA) 100-62.5-25 MCG/INH oral inhaler 4/12/2021 at Unknown time  Yes Yes   Sig: Inhale 1 puff into the lungs daily   albuterol (PROAIR HFA/PROVENTIL HFA/VENTOLIN HFA) 108 (90 Base) MCG/ACT inhaler   Yes No   Sig: Inhale 2 puffs into the lungs every 4 hours as needed   aspirin 81 MG EC tablet 4/11/2021 at Unknown time  Yes Yes   Sig: Take 81 mg by mouth daily   atenolol (TENORMIN) 25 MG tablet 4/11/2021 at Unknown time  Yes Yes   Sig: Take 25 mg by mouth daily   fa-pyridoxine-cyancobalamin 2.5-25-2 MG TABS per tablet   Yes No   Sig: Take 1 tablet by mouth daily   gabapentin (NEURONTIN) 300 MG capsule   Yes No   Sig: Take 300 mg by mouth At Bedtime   leflunomide (ARAVA) 10 MG tablet 4/11/2021 at Unknown time  Yes Yes   Sig: Take 10 mg by mouth daily   lisinopril (ZESTRIL) 20 MG tablet 4/11/2021 at Unknown time  Yes Yes   Sig: Take 20 mg by mouth daily   "  methotrexate 2.5 MG tablet Past Week at Unknown time  No Yes   Sig: Take 9 tablets (22.5 mg) by mouth every 7 days On .  Skip  and resume on    multivitamin  with lutein (OCUVITE WITH LTEIN) CAPS per capsule 2021 at Unknown time  Yes Yes   Sig: Take 1 capsule by mouth daily   naproxen sodium (ANAPROX) 220 MG tablet   Yes No   Sig: Take 220 mg by mouth 2 times daily as needed for moderate pain or headaches      Facility-Administered Medications: None       Patient Active Problem List   Diagnosis     Pain in joint, shoulder region     COPD with acute exacerbation (H)     Cancer of right colon (H)     AGE (acute gastroenteritis)        Past Medical History:   Diagnosis Date     COPD (chronic obstructive pulmonary disease) (H)      Hypertension      Rheumatoid arthritis (H)         Past Surgical History:   Procedure Laterality Date     ------------OTHER------------- Right     Right middle finger RA nodule removed     brochoscopy      x 2     COLONOSCOPY       LAPAROSCOPIC ASSISTED COLECTOMY Right 2020    Procedure: laparoscopic assisted right [place] colectomy;  Surgeon: Skye Mays MD;  Location:  OR       Social History     Tobacco Use     Smoking status: Former Smoker     Packs/day: 0.25     Years: 55.00     Pack years: 13.75     Types: Cigarettes     Quit date: 1/10/2000     Years since quittin.2     Smokeless tobacco: Never Used   Substance Use Topics     Alcohol use: Not Currently     Frequency: Never       History reviewed. No pertinent family history.    REVIEW OF SYSTEMS:     5 point ROS negative except as noted above in HPI, including Gen., Resp., CV, GI &  system review.      PHYSICAL EXAM:   Ht 1.651 m (5' 5\")   Wt 76.2 kg (168 lb)   BMI 27.96 kg/m   Estimated body mass index is 27.96 kg/m  as calculated from the following:    Height as of this encounter: 1.651 m (5' 5\").    Weight as of this encounter: 76.2 kg (168 lb).   GENERAL APPEARANCE: " healthy and alert  MENTAL STATUS: alert  AIRWAY EXAM: Mallampatti Class II (visualization of the soft palate, fauces, and uvula)  RESP: lungs clear to auscultation - no rales, rhonchi or wheezes  CV: regular rates and rhythm      DIAGNOSTICS:    Not indicated      IMPRESSION   ASA Class 2 - Mild systemic disease        PLAN:       Plan for colonoscopy. We discussed the risks, benefits and alternatives and the patient wished to proceed.    The above has been forwarded to the consulting provider.      Signed Electronically by: Skye Mays MD, MD  April 12, 2021

## 2022-03-07 NOTE — CONSULTS
Care Transition Initial Assessment - SW     Met with: Patient    Active Problems:    COPD with acute exacerbation (H)       DATA  Lives With: alone   Living Arrangements: apartment, independent living facility- [facility]    Quality of Family Relationships: involved, supportive  Description of Support System: Supportive, Involved  Who is your support system?: Children  Support Assessment: Adequate family and caregiver support, Adequate social supports. Has adult children who are supportive   Identified issues/concerns regarding health management: Being treated for RSV, has H.o COPD  Still smoked, does not use home o2   @   Resources List: Home Care  Is interested in home care. Pt would like SW to check to see who [facility] contract with   Facility uses [facility]. Spoke with pt about choices from Clinic as well.   Pt aware that she can use [facility]. [facility] and [facility]    Pt educated about  Medicare Compare list for Home Care, with associated star ratings to assist with choice for referrals/discharge planning Yes  Education was given to pt/family that star ratings are updated/maintained by Medicare and can be reviewed by visiting www.medicare.gov Yes     Quality of Family Relationships: involved, supportive  Transportation Anticipated: family or friend will provide  Son could provide transport   ASSESSMENT  Cognitive Status:  awake, alert and oriented  Concerns to be addressed: Met with pt about d.c planning support. Pt reported she is considering buying a walker with a bench seat.  Pt aware she may need home o2 due to COPD History  Pt would like to know what her current Hospital bill is to see how well her insurance covers her care. SW talked with pt about limitation of knowing exact billing needs due to length of stay, procedures and medication support. Pt expressed understanding    Provide info on about HC options for pt to consider    PLAN  Following for possible home PT support at time of  Patient has been on these medications for a while.  Okay to  Continue.   d.c [facility]  Inpatient Care Coordination   [phone]  [facility]

## 2023-01-01 ENCOUNTER — HOSPITAL ENCOUNTER (INPATIENT)
Facility: CLINIC | Age: 78
LOS: 2 days | Discharge: HOME | DRG: 193 | End: 2023-01-13
Attending: EMERGENCY MEDICINE | Admitting: INTERNAL MEDICINE
Payer: COMMERCIAL

## 2023-01-01 ENCOUNTER — HOSPITAL ENCOUNTER (EMERGENCY)
Facility: CLINIC | Age: 78
Discharge: ANOTHER HOSPITAL | End: 2023-04-20
Attending: EMERGENCY MEDICINE | Admitting: EMERGENCY MEDICINE
Payer: COMMERCIAL

## 2023-01-01 ENCOUNTER — HOSPITAL ENCOUNTER (OUTPATIENT)
Facility: CLINIC | Age: 78
End: 2023-01-01
Attending: INTERNAL MEDICINE | Admitting: INTERNAL MEDICINE
Payer: COMMERCIAL

## 2023-01-01 ENCOUNTER — APPOINTMENT (OUTPATIENT)
Dept: CT IMAGING | Facility: CLINIC | Age: 78
DRG: 193 | End: 2023-01-01
Attending: EMERGENCY MEDICINE
Payer: COMMERCIAL

## 2023-01-01 ENCOUNTER — APPOINTMENT (OUTPATIENT)
Dept: GENERAL RADIOLOGY | Facility: CLINIC | Age: 78
End: 2023-01-01
Attending: EMERGENCY MEDICINE
Payer: COMMERCIAL

## 2023-01-01 ENCOUNTER — PATIENT OUTREACH (OUTPATIENT)
Dept: CARE COORDINATION | Facility: CLINIC | Age: 78
End: 2023-01-01

## 2023-01-01 VITALS
SYSTOLIC BLOOD PRESSURE: 157 MMHG | HEART RATE: 82 BPM | DIASTOLIC BLOOD PRESSURE: 92 MMHG | HEIGHT: 65 IN | RESPIRATION RATE: 18 BRPM | TEMPERATURE: 97.7 F | BODY MASS INDEX: 30.54 KG/M2 | WEIGHT: 183.3 LBS | OXYGEN SATURATION: 93 %

## 2023-01-01 VITALS
HEART RATE: 78 BPM | DIASTOLIC BLOOD PRESSURE: 82 MMHG | BODY MASS INDEX: 32.32 KG/M2 | WEIGHT: 194.22 LBS | OXYGEN SATURATION: 82 % | RESPIRATION RATE: 32 BRPM | SYSTOLIC BLOOD PRESSURE: 100 MMHG

## 2023-01-01 DIAGNOSIS — J18.9 COMMUNITY ACQUIRED PNEUMONIA, UNSPECIFIED LATERALITY: ICD-10-CM

## 2023-01-01 DIAGNOSIS — M06.9 RHEUMATOID ARTHRITIS, INVOLVING UNSPECIFIED SITE, UNSPECIFIED WHETHER RHEUMATOID FACTOR PRESENT (H): ICD-10-CM

## 2023-01-01 DIAGNOSIS — J44.1 COPD WITH ACUTE EXACERBATION (H): Primary | ICD-10-CM

## 2023-01-01 DIAGNOSIS — I21.3 ST ELEVATION MYOCARDIAL INFARCTION (STEMI), UNSPECIFIED ARTERY (H): ICD-10-CM

## 2023-01-01 DIAGNOSIS — R06.82 TACHYPNEA: ICD-10-CM

## 2023-01-01 LAB
ALBUMIN SERPL BCG-MCNC: 3.5 G/DL (ref 3.5–5.2)
ALP SERPL-CCNC: 89 U/L (ref 35–104)
ALT SERPL W P-5'-P-CCNC: 21 U/L (ref 10–35)
ANION GAP SERPL CALCULATED.3IONS-SCNC: 11 MMOL/L (ref 7–15)
ANION GAP SERPL CALCULATED.3IONS-SCNC: 14 MMOL/L (ref 7–15)
ANION GAP SERPL CALCULATED.3IONS-SCNC: 16 MMOL/L (ref 7–15)
AST SERPL W P-5'-P-CCNC: 29 U/L (ref 10–35)
ATRIAL RATE - MUSE: 55 BPM
ATRIAL RATE - MUSE: 76 BPM
ATRIAL RATE - MUSE: 79 BPM
BACTERIA BLD CULT: NO GROWTH
BACTERIA BLD CULT: NO GROWTH
BACTERIA SPT CULT: NORMAL
BASE EXCESS BLDV CALC-SCNC: -0.5 MMOL/L (ref -7.7–1.9)
BASOPHILS # BLD AUTO: 0.1 10E3/UL (ref 0–0.2)
BASOPHILS # BLD AUTO: 0.1 10E3/UL (ref 0–0.2)
BASOPHILS NFR BLD AUTO: 0 %
BASOPHILS NFR BLD AUTO: 1 %
BILIRUB DIRECT SERPL-MCNC: 0.27 MG/DL (ref 0–0.3)
BILIRUB SERPL-MCNC: 0.7 MG/DL
BUN SERPL-MCNC: 19.9 MG/DL (ref 8–23)
BUN SERPL-MCNC: 23 MG/DL (ref 8–23)
BUN SERPL-MCNC: 27.3 MG/DL (ref 8–23)
CALCIUM SERPL-MCNC: 9 MG/DL (ref 8.8–10.2)
CALCIUM SERPL-MCNC: 9.1 MG/DL (ref 8.8–10.2)
CALCIUM SERPL-MCNC: 9.3 MG/DL (ref 8.8–10.2)
CHLORIDE SERPL-SCNC: 100 MMOL/L (ref 98–107)
CHLORIDE SERPL-SCNC: 101 MMOL/L (ref 98–107)
CHLORIDE SERPL-SCNC: 102 MMOL/L (ref 98–107)
CREAT SERPL-MCNC: 0.98 MG/DL (ref 0.51–0.95)
CREAT SERPL-MCNC: 1.18 MG/DL (ref 0.51–0.95)
CREAT SERPL-MCNC: 1.52 MG/DL (ref 0.51–0.95)
DEPRECATED HCO3 PLAS-SCNC: 20 MMOL/L (ref 22–29)
DEPRECATED HCO3 PLAS-SCNC: 22 MMOL/L (ref 22–29)
DEPRECATED HCO3 PLAS-SCNC: 24 MMOL/L (ref 22–29)
DIASTOLIC BLOOD PRESSURE - MUSE: NORMAL MMHG
EOSINOPHIL # BLD AUTO: 0.2 10E3/UL (ref 0–0.7)
EOSINOPHIL # BLD AUTO: 0.3 10E3/UL (ref 0–0.7)
EOSINOPHIL NFR BLD AUTO: 1 %
EOSINOPHIL NFR BLD AUTO: 4 %
ERYTHROCYTE [DISTWIDTH] IN BLOOD BY AUTOMATED COUNT: 16.8 % (ref 10–15)
ERYTHROCYTE [DISTWIDTH] IN BLOOD BY AUTOMATED COUNT: 17.1 % (ref 10–15)
ERYTHROCYTE [DISTWIDTH] IN BLOOD BY AUTOMATED COUNT: 17.3 % (ref 10–15)
FLUAV RNA SPEC QL NAA+PROBE: NEGATIVE
FLUBV RNA RESP QL NAA+PROBE: NEGATIVE
GFR SERPL CREATININE-BSD FRML MDRD: 35 ML/MIN/1.73M2
GFR SERPL CREATININE-BSD FRML MDRD: 47 ML/MIN/1.73M2
GFR SERPL CREATININE-BSD FRML MDRD: 59 ML/MIN/1.73M2
GLUCOSE SERPL-MCNC: 147 MG/DL (ref 70–99)
GLUCOSE SERPL-MCNC: 168 MG/DL (ref 70–99)
GLUCOSE SERPL-MCNC: 259 MG/DL (ref 70–99)
GRAM STAIN RESULT: NORMAL
HCO3 BLDV-SCNC: 26 MMOL/L (ref 21–28)
HCT VFR BLD AUTO: 37.6 % (ref 35–47)
HCT VFR BLD AUTO: 39.1 % (ref 35–47)
HCT VFR BLD AUTO: 40.7 % (ref 35–47)
HGB BLD-MCNC: 11.7 G/DL (ref 11.7–15.7)
HGB BLD-MCNC: 11.9 G/DL (ref 11.7–15.7)
HGB BLD-MCNC: 12.6 G/DL (ref 11.7–15.7)
HOLD SPECIMEN: NORMAL
HOLD SPECIMEN: NORMAL
IMM GRANULOCYTES # BLD: 0.1 10E3/UL
IMM GRANULOCYTES # BLD: 0.1 10E3/UL
IMM GRANULOCYTES NFR BLD: 1 %
IMM GRANULOCYTES NFR BLD: 1 %
INTERPRETATION ECG - MUSE: NORMAL
LACTATE SERPL-SCNC: 1.8 MMOL/L (ref 0.7–2)
LACTATE SERPL-SCNC: 2.1 MMOL/L (ref 0.7–2)
LYMPHOCYTES # BLD AUTO: 0.2 10E3/UL (ref 0.8–5.3)
LYMPHOCYTES # BLD AUTO: 0.4 10E3/UL (ref 0.8–5.3)
LYMPHOCYTES NFR BLD AUTO: 2 %
LYMPHOCYTES NFR BLD AUTO: 6 %
MAGNESIUM SERPL-MCNC: 2.1 MG/DL (ref 1.7–2.3)
MAGNESIUM SERPL-MCNC: 2.1 MG/DL (ref 1.7–2.3)
MCH RBC QN AUTO: 32.1 PG (ref 26.5–33)
MCH RBC QN AUTO: 32.4 PG (ref 26.5–33)
MCH RBC QN AUTO: 33.4 PG (ref 26.5–33)
MCHC RBC AUTO-ENTMCNC: 30.4 G/DL (ref 31.5–36.5)
MCHC RBC AUTO-ENTMCNC: 31 G/DL (ref 31.5–36.5)
MCHC RBC AUTO-ENTMCNC: 31.1 G/DL (ref 31.5–36.5)
MCV RBC AUTO: 103 FL (ref 78–100)
MCV RBC AUTO: 105 FL (ref 78–100)
MCV RBC AUTO: 110 FL (ref 78–100)
MONOCYTES # BLD AUTO: 0.2 10E3/UL (ref 0–1.3)
MONOCYTES # BLD AUTO: 0.2 10E3/UL (ref 0–1.3)
MONOCYTES NFR BLD AUTO: 1 %
MONOCYTES NFR BLD AUTO: 2 %
NEUTROPHILS # BLD AUTO: 13.1 10E3/UL (ref 1.6–8.3)
NEUTROPHILS # BLD AUTO: 6.2 10E3/UL (ref 1.6–8.3)
NEUTROPHILS NFR BLD AUTO: 86 %
NEUTROPHILS NFR BLD AUTO: 95 %
NRBC # BLD AUTO: 0 10E3/UL
NRBC # BLD AUTO: 0.1 10E3/UL
NRBC BLD AUTO-RTO: 0 /100
NRBC BLD AUTO-RTO: 1 /100
NT-PROBNP SERPL-MCNC: 1387 PG/ML (ref 0–1800)
NT-PROBNP SERPL-MCNC: 4046 PG/ML (ref 0–1800)
O2/TOTAL GAS SETTING VFR VENT: 0 %
OXYHGB MFR BLDV: 47 % (ref 70–75)
P AXIS - MUSE: -6 DEGREES
P AXIS - MUSE: 86 DEGREES
P AXIS - MUSE: NORMAL DEGREES
PCO2 BLDV: 48 MM HG (ref 40–50)
PH BLDV: 7.34 [PH] (ref 7.32–7.43)
PLATELET # BLD AUTO: 217 10E3/UL (ref 150–450)
PLATELET # BLD AUTO: 219 10E3/UL (ref 150–450)
PLATELET # BLD AUTO: 221 10E3/UL (ref 150–450)
PO2 BLDV: 29 MM HG (ref 25–47)
POTASSIUM SERPL-SCNC: 4.3 MMOL/L (ref 3.4–5.3)
POTASSIUM SERPL-SCNC: 4.8 MMOL/L (ref 3.4–5.3)
POTASSIUM SERPL-SCNC: 4.9 MMOL/L (ref 3.4–5.3)
POTASSIUM SERPL-SCNC: 5.1 MMOL/L (ref 3.4–5.3)
PR INTERVAL - MUSE: 136 MS
PR INTERVAL - MUSE: 320 MS
PR INTERVAL - MUSE: NORMAL MS
PROT SERPL-MCNC: 7.2 G/DL (ref 6.4–8.3)
QRS DURATION - MUSE: 66 MS
QRS DURATION - MUSE: 70 MS
QRS DURATION - MUSE: 70 MS
QT - MUSE: 354 MS
QT - MUSE: 356 MS
QT - MUSE: 384 MS
QTC - MUSE: 396 MS
QTC - MUSE: 400 MS
QTC - MUSE: 405 MS
R AXIS - MUSE: 0 DEGREES
R AXIS - MUSE: 47 DEGREES
R AXIS - MUSE: 50 DEGREES
RBC # BLD AUTO: 3.56 10E6/UL (ref 3.8–5.2)
RBC # BLD AUTO: 3.65 10E6/UL (ref 3.8–5.2)
RBC # BLD AUTO: 3.89 10E6/UL (ref 3.8–5.2)
RSV RNA SPEC NAA+PROBE: NEGATIVE
SARS-COV-2 RNA RESP QL NAA+PROBE: NEGATIVE
SODIUM SERPL-SCNC: 136 MMOL/L (ref 136–145)
SODIUM SERPL-SCNC: 137 MMOL/L (ref 136–145)
SODIUM SERPL-SCNC: 137 MMOL/L (ref 136–145)
SYSTOLIC BLOOD PRESSURE - MUSE: NORMAL MMHG
T AXIS - MUSE: -7 DEGREES
T AXIS - MUSE: 105 DEGREES
T AXIS - MUSE: 96 DEGREES
TROPONIN T SERPL HS-MCNC: 14 NG/L
TROPONIN T SERPL HS-MCNC: 249 NG/L
VENTRICULAR RATE- MUSE: 64 BPM
VENTRICULAR RATE- MUSE: 76 BPM
VENTRICULAR RATE- MUSE: 79 BPM
WBC # BLD AUTO: 13 10E3/UL (ref 4–11)
WBC # BLD AUTO: 13.7 10E3/UL (ref 4–11)
WBC # BLD AUTO: 7.2 10E3/UL (ref 4–11)

## 2023-01-01 PROCEDURE — 99238 HOSP IP/OBS DSCHRG MGMT 30/<: CPT | Performed by: INTERNAL MEDICINE

## 2023-01-01 PROCEDURE — 83735 ASSAY OF MAGNESIUM: CPT | Performed by: INTERNAL MEDICINE

## 2023-01-01 PROCEDURE — 83880 ASSAY OF NATRIURETIC PEPTIDE: CPT | Performed by: EMERGENCY MEDICINE

## 2023-01-01 PROCEDURE — 36415 COLL VENOUS BLD VENIPUNCTURE: CPT | Performed by: EMERGENCY MEDICINE

## 2023-01-01 PROCEDURE — 85025 COMPLETE CBC W/AUTO DIFF WBC: CPT | Performed by: EMERGENCY MEDICINE

## 2023-01-01 PROCEDURE — 87637 SARSCOV2&INF A&B&RSV AMP PRB: CPT | Performed by: EMERGENCY MEDICINE

## 2023-01-01 PROCEDURE — 82805 BLOOD GASES W/O2 SATURATION: CPT | Performed by: EMERGENCY MEDICINE

## 2023-01-01 PROCEDURE — 87040 BLOOD CULTURE FOR BACTERIA: CPT | Performed by: EMERGENCY MEDICINE

## 2023-01-01 PROCEDURE — 87205 SMEAR GRAM STAIN: CPT | Performed by: INTERNAL MEDICINE

## 2023-01-01 PROCEDURE — 99291 CRITICAL CARE FIRST HOUR: CPT | Mod: 25

## 2023-01-01 PROCEDURE — 80048 BASIC METABOLIC PNL TOTAL CA: CPT | Performed by: EMERGENCY MEDICINE

## 2023-01-01 PROCEDURE — 258N000003 HC RX IP 258 OP 636: Performed by: INTERNAL MEDICINE

## 2023-01-01 PROCEDURE — 96365 THER/PROPH/DIAG IV INF INIT: CPT

## 2023-01-01 PROCEDURE — 82248 BILIRUBIN DIRECT: CPT | Performed by: EMERGENCY MEDICINE

## 2023-01-01 PROCEDURE — 250N000011 HC RX IP 250 OP 636: Performed by: INTERNAL MEDICINE

## 2023-01-01 PROCEDURE — 93005 ELECTROCARDIOGRAM TRACING: CPT

## 2023-01-01 PROCEDURE — 250N000013 HC RX MED GY IP 250 OP 250 PS 637: Performed by: INTERNAL MEDICINE

## 2023-01-01 PROCEDURE — 99223 1ST HOSP IP/OBS HIGH 75: CPT | Mod: AI | Performed by: INTERNAL MEDICINE

## 2023-01-01 PROCEDURE — 85027 COMPLETE CBC AUTOMATED: CPT | Performed by: INTERNAL MEDICINE

## 2023-01-01 PROCEDURE — 999N000157 HC STATISTIC RCP TIME EA 10 MIN

## 2023-01-01 PROCEDURE — 120N000001 HC R&B MED SURG/OB

## 2023-01-01 PROCEDURE — 80053 COMPREHEN METABOLIC PANEL: CPT | Performed by: EMERGENCY MEDICINE

## 2023-01-01 PROCEDURE — 96374 THER/PROPH/DIAG INJ IV PUSH: CPT | Mod: 59

## 2023-01-01 PROCEDURE — 84484 ASSAY OF TROPONIN QUANT: CPT | Performed by: EMERGENCY MEDICINE

## 2023-01-01 PROCEDURE — 258N000003 HC RX IP 258 OP 636: Performed by: EMERGENCY MEDICINE

## 2023-01-01 PROCEDURE — 36415 COLL VENOUS BLD VENIPUNCTURE: CPT | Performed by: INTERNAL MEDICINE

## 2023-01-01 PROCEDURE — 99285 EMERGENCY DEPT VISIT HI MDM: CPT | Mod: 25

## 2023-01-01 PROCEDURE — 80048 BASIC METABOLIC PNL TOTAL CA: CPT | Performed by: INTERNAL MEDICINE

## 2023-01-01 PROCEDURE — 84132 ASSAY OF SERUM POTASSIUM: CPT | Performed by: INTERNAL MEDICINE

## 2023-01-01 PROCEDURE — 250N000013 HC RX MED GY IP 250 OP 250 PS 637: Performed by: EMERGENCY MEDICINE

## 2023-01-01 PROCEDURE — 83605 ASSAY OF LACTIC ACID: CPT | Performed by: INTERNAL MEDICINE

## 2023-01-01 PROCEDURE — 71275 CT ANGIOGRAPHY CHEST: CPT

## 2023-01-01 PROCEDURE — 250N000012 HC RX MED GY IP 250 OP 636 PS 637: Performed by: INTERNAL MEDICINE

## 2023-01-01 PROCEDURE — 96375 TX/PRO/DX INJ NEW DRUG ADDON: CPT

## 2023-01-01 PROCEDURE — 250N000011 HC RX IP 250 OP 636

## 2023-01-01 PROCEDURE — C9803 HOPD COVID-19 SPEC COLLECT: HCPCS

## 2023-01-01 PROCEDURE — 83735 ASSAY OF MAGNESIUM: CPT | Performed by: EMERGENCY MEDICINE

## 2023-01-01 PROCEDURE — 94640 AIRWAY INHALATION TREATMENT: CPT

## 2023-01-01 PROCEDURE — 250N000009 HC RX 250

## 2023-01-01 PROCEDURE — 87070 CULTURE OTHR SPECIMN AEROBIC: CPT | Performed by: INTERNAL MEDICINE

## 2023-01-01 PROCEDURE — 71045 X-RAY EXAM CHEST 1 VIEW: CPT

## 2023-01-01 PROCEDURE — 250N000009 HC RX 250: Performed by: EMERGENCY MEDICINE

## 2023-01-01 PROCEDURE — 99233 SBSQ HOSP IP/OBS HIGH 50: CPT | Performed by: INTERNAL MEDICINE

## 2023-01-01 PROCEDURE — 250N000011 HC RX IP 250 OP 636: Performed by: EMERGENCY MEDICINE

## 2023-01-01 PROCEDURE — 94640 AIRWAY INHALATION TREATMENT: CPT | Mod: 76

## 2023-01-01 RX ORDER — LISINOPRIL 20 MG/1
20 TABLET ORAL DAILY
Status: DISCONTINUED | OUTPATIENT
Start: 2023-01-01 | End: 2023-01-01 | Stop reason: HOSPADM

## 2023-01-01 RX ORDER — ACETAMINOPHEN 650 MG/1
650 SUPPOSITORY RECTAL EVERY 6 HOURS PRN
Status: DISCONTINUED | OUTPATIENT
Start: 2023-01-01 | End: 2023-01-01 | Stop reason: HOSPADM

## 2023-01-01 RX ORDER — CEFTRIAXONE 2 G/1
2 INJECTION, POWDER, FOR SOLUTION INTRAMUSCULAR; INTRAVENOUS ONCE
Status: COMPLETED | OUTPATIENT
Start: 2023-01-01 | End: 2023-01-01

## 2023-01-01 RX ORDER — VITAMIN B COMPLEX
2000 TABLET ORAL DAILY
Status: DISCONTINUED | OUTPATIENT
Start: 2023-01-01 | End: 2023-01-01 | Stop reason: HOSPADM

## 2023-01-01 RX ORDER — GABAPENTIN 100 MG/1
300 CAPSULE ORAL AT BEDTIME
Status: DISCONTINUED | OUTPATIENT
Start: 2023-01-01 | End: 2023-01-01

## 2023-01-01 RX ORDER — MORPHINE SULFATE 4 MG/ML
4 INJECTION, SOLUTION INTRAMUSCULAR; INTRAVENOUS
Status: COMPLETED | OUTPATIENT
Start: 2023-01-01 | End: 2023-01-01

## 2023-01-01 RX ORDER — LIDOCAINE 40 MG/G
CREAM TOPICAL
Status: DISCONTINUED | OUTPATIENT
Start: 2023-01-01 | End: 2023-01-01 | Stop reason: HOSPADM

## 2023-01-01 RX ORDER — AZITHROMYCIN 250 MG/1
250 TABLET, FILM COATED ORAL DAILY
Qty: 3 TABLET | Refills: 0 | Status: SHIPPED | OUTPATIENT
Start: 2023-01-01 | End: 2023-01-01

## 2023-01-01 RX ORDER — PREDNISONE 20 MG/1
40 TABLET ORAL DAILY
Qty: 10 TABLET | Refills: 0 | Status: SHIPPED | OUTPATIENT
Start: 2023-01-01 | End: 2023-01-01

## 2023-01-01 RX ORDER — NOREPINEPHRINE BITARTRATE 0.02 MG/ML
INJECTION, SOLUTION INTRAVENOUS
Status: COMPLETED
Start: 2023-01-01 | End: 2023-01-01

## 2023-01-01 RX ORDER — ONDANSETRON 4 MG/1
4 TABLET, ORALLY DISINTEGRATING ORAL EVERY 6 HOURS PRN
Status: DISCONTINUED | OUTPATIENT
Start: 2023-01-01 | End: 2023-01-01 | Stop reason: HOSPADM

## 2023-01-01 RX ORDER — FLUTICASONE FUROATE AND VILANTEROL 200; 25 UG/1; UG/1
1 POWDER RESPIRATORY (INHALATION) DAILY
Status: DISCONTINUED | OUTPATIENT
Start: 2023-01-01 | End: 2023-01-01 | Stop reason: HOSPADM

## 2023-01-01 RX ORDER — ONDANSETRON 2 MG/ML
4 INJECTION INTRAMUSCULAR; INTRAVENOUS EVERY 6 HOURS PRN
Status: DISCONTINUED | OUTPATIENT
Start: 2023-01-01 | End: 2023-01-01 | Stop reason: HOSPADM

## 2023-01-01 RX ORDER — VIT C/E/ZN/COPPR/LUTEIN/ZEAXAN 60 MG-6 MG
1 CAPSULE ORAL DAILY
Status: DISCONTINUED | OUTPATIENT
Start: 2023-01-01 | End: 2023-01-01

## 2023-01-01 RX ORDER — PROCHLORPERAZINE 25 MG
12.5 SUPPOSITORY, RECTAL RECTAL EVERY 12 HOURS PRN
Status: DISCONTINUED | OUTPATIENT
Start: 2023-01-01 | End: 2023-01-01 | Stop reason: HOSPADM

## 2023-01-01 RX ORDER — ASPIRIN 81 MG/1
81 TABLET ORAL DAILY
Status: DISCONTINUED | OUTPATIENT
Start: 2023-01-01 | End: 2023-01-01 | Stop reason: HOSPADM

## 2023-01-01 RX ORDER — FENTANYL CITRATE 50 UG/ML
INJECTION, SOLUTION INTRAMUSCULAR; INTRAVENOUS
Status: COMPLETED
Start: 2023-01-01 | End: 2023-01-01

## 2023-01-01 RX ORDER — PREDNISONE 5 MG/1
5 TABLET ORAL DAILY
Status: DISCONTINUED | OUTPATIENT
Start: 2023-01-01 | End: 2023-01-01 | Stop reason: HOSPADM

## 2023-01-01 RX ORDER — MULTIPLE VITAMINS W/ MINERALS TAB 9MG-400MCG
1 TAB ORAL DAILY
Status: DISCONTINUED | OUTPATIENT
Start: 2023-01-01 | End: 2023-01-01 | Stop reason: HOSPADM

## 2023-01-01 RX ORDER — PROCHLORPERAZINE MALEATE 5 MG
5 TABLET ORAL EVERY 6 HOURS PRN
Status: DISCONTINUED | OUTPATIENT
Start: 2023-01-01 | End: 2023-01-01 | Stop reason: HOSPADM

## 2023-01-01 RX ORDER — ROPIVACAINE IN 0.9% SOD CHL/PF 0.1 %
.03-.125 PLASTIC BAG, INJECTION (ML) EPIDURAL CONTINUOUS
Status: DISCONTINUED | OUTPATIENT
Start: 2023-01-01 | End: 2023-01-01 | Stop reason: HOSPADM

## 2023-01-01 RX ORDER — AZITHROMYCIN 500 MG/5ML
500 INJECTION, POWDER, LYOPHILIZED, FOR SOLUTION INTRAVENOUS EVERY 24 HOURS
Status: DISCONTINUED | OUTPATIENT
Start: 2023-01-01 | End: 2023-01-01

## 2023-01-01 RX ORDER — ALBUTEROL SULFATE 90 UG/1
2 AEROSOL, METERED RESPIRATORY (INHALATION) EVERY 4 HOURS PRN
Status: DISCONTINUED | OUTPATIENT
Start: 2023-01-01 | End: 2023-01-01 | Stop reason: HOSPADM

## 2023-01-01 RX ORDER — ONDANSETRON 2 MG/ML
INJECTION INTRAMUSCULAR; INTRAVENOUS
Status: COMPLETED
Start: 2023-01-01 | End: 2023-01-01

## 2023-01-01 RX ORDER — NITROGLYCERIN 0.4 MG/1
0.4 TABLET SUBLINGUAL EVERY 5 MIN PRN
Status: DISCONTINUED | OUTPATIENT
Start: 2023-01-01 | End: 2023-01-01 | Stop reason: HOSPADM

## 2023-01-01 RX ORDER — LEFLUNOMIDE 10 MG/1
10 TABLET ORAL DAILY
Status: DISCONTINUED | OUTPATIENT
Start: 2023-01-01 | End: 2023-01-01 | Stop reason: HOSPADM

## 2023-01-01 RX ORDER — IOPAMIDOL 755 MG/ML
120 INJECTION, SOLUTION INTRAVASCULAR ONCE
Status: COMPLETED | OUTPATIENT
Start: 2023-01-01 | End: 2023-01-01

## 2023-01-01 RX ORDER — ATENOLOL 25 MG/1
25 TABLET ORAL DAILY
Status: DISCONTINUED | OUTPATIENT
Start: 2023-01-01 | End: 2023-01-01 | Stop reason: HOSPADM

## 2023-01-01 RX ORDER — NAPROXEN SODIUM 220 MG
220 TABLET ORAL 2 TIMES DAILY PRN
Status: DISCONTINUED | OUTPATIENT
Start: 2023-01-01 | End: 2023-01-01

## 2023-01-01 RX ORDER — PREDNISONE 5 MG/1
5 TABLET ORAL DAILY
Status: ON HOLD | COMMUNITY
End: 2023-01-01

## 2023-01-01 RX ORDER — ACETAMINOPHEN 325 MG/1
650 TABLET ORAL EVERY 6 HOURS PRN
Status: DISCONTINUED | OUTPATIENT
Start: 2023-01-01 | End: 2023-01-01 | Stop reason: HOSPADM

## 2023-01-01 RX ORDER — CEFTRIAXONE 2 G/1
2 INJECTION, POWDER, FOR SOLUTION INTRAMUSCULAR; INTRAVENOUS EVERY 24 HOURS
Status: DISCONTINUED | OUTPATIENT
Start: 2023-01-01 | End: 2023-01-01 | Stop reason: HOSPADM

## 2023-01-01 RX ORDER — CEFDINIR 300 MG/1
300 CAPSULE ORAL 2 TIMES DAILY
Qty: 6 CAPSULE | Refills: 0 | Status: SHIPPED | OUTPATIENT
Start: 2023-01-01 | End: 2023-01-01

## 2023-01-01 RX ORDER — PREDNISONE 5 MG/1
5 TABLET ORAL DAILY
Start: 2023-01-01

## 2023-01-01 RX ORDER — AZITHROMYCIN 500 MG/5ML
500 INJECTION, POWDER, LYOPHILIZED, FOR SOLUTION INTRAVENOUS ONCE
Status: COMPLETED | OUTPATIENT
Start: 2023-01-01 | End: 2023-01-01

## 2023-01-01 RX ADMIN — LISINOPRIL 20 MG: 20 TABLET ORAL at 08:37

## 2023-01-01 RX ADMIN — CEFTRIAXONE 2 G: 2 INJECTION, POWDER, FOR SOLUTION INTRAMUSCULAR; INTRAVENOUS at 19:23

## 2023-01-01 RX ADMIN — MULTIPLE VITAMINS W/ MINERALS TAB 1 TABLET: TAB at 08:38

## 2023-01-01 RX ADMIN — LEFLUNOMIDE 10 MG: 10 TABLET ORAL at 08:17

## 2023-01-01 RX ADMIN — ASPIRIN 81 MG: 81 TABLET, COATED ORAL at 08:39

## 2023-01-01 RX ADMIN — ONDANSETRON 4 MG: 2 INJECTION INTRAMUSCULAR; INTRAVENOUS at 07:37

## 2023-01-01 RX ADMIN — FENTANYL CITRATE 50 MCG: 50 INJECTION, SOLUTION INTRAMUSCULAR; INTRAVENOUS at 07:31

## 2023-01-01 RX ADMIN — FLUTICASONE FUROATE AND VILANTEROL TRIFENATATE 1 PUFF: 200; 25 POWDER RESPIRATORY (INHALATION) at 08:42

## 2023-01-01 RX ADMIN — Medication 2000 UNITS: at 08:16

## 2023-01-01 RX ADMIN — UMECLIDINIUM 1 PUFF: 62.5 AEROSOL, POWDER ORAL at 08:42

## 2023-01-01 RX ADMIN — SODIUM CHLORIDE 82 ML: 9 INJECTION, SOLUTION INTRAVENOUS at 17:50

## 2023-01-01 RX ADMIN — HEPARIN SODIUM 6000 UNITS: 1000 INJECTION, SOLUTION INTRAVENOUS; SUBCUTANEOUS at 07:19

## 2023-01-01 RX ADMIN — LEFLUNOMIDE 10 MG: 10 TABLET ORAL at 08:38

## 2023-01-01 RX ADMIN — ATENOLOL 25 MG: 25 TABLET ORAL at 08:37

## 2023-01-01 RX ADMIN — Medication 0.3 MCG/KG/MIN: at 07:19

## 2023-01-01 RX ADMIN — FLUTICASONE FUROATE AND VILANTEROL TRIFENATATE 1 PUFF: 200; 25 POWDER RESPIRATORY (INHALATION) at 07:07

## 2023-01-01 RX ADMIN — PREDNISONE 5 MG: 5 TABLET ORAL at 08:16

## 2023-01-01 RX ADMIN — AZITHROMYCIN MONOHYDRATE 250 MG: 500 INJECTION, POWDER, LYOPHILIZED, FOR SOLUTION INTRAVENOUS at 20:16

## 2023-01-01 RX ADMIN — Medication 2000 UNITS: at 08:47

## 2023-01-01 RX ADMIN — TICAGRELOR 180 MG: 90 TABLET ORAL at 07:21

## 2023-01-01 RX ADMIN — SODIUM CHLORIDE 1000 ML: 9 INJECTION, SOLUTION INTRAVENOUS at 07:04

## 2023-01-01 RX ADMIN — UMECLIDINIUM 1 PUFF: 62.5 AEROSOL, POWDER ORAL at 07:07

## 2023-01-01 RX ADMIN — MULTIPLE VITAMINS W/ MINERALS TAB 1 TABLET: TAB at 08:16

## 2023-01-01 RX ADMIN — IOPAMIDOL 63 ML: 755 INJECTION, SOLUTION INTRAVENOUS at 17:49

## 2023-01-01 RX ADMIN — CEFTRIAXONE 2 G: 2 INJECTION, POWDER, FOR SOLUTION INTRAMUSCULAR; INTRAVENOUS at 19:17

## 2023-01-01 RX ADMIN — AZITHROMYCIN MONOHYDRATE 500 MG: 500 INJECTION, POWDER, LYOPHILIZED, FOR SOLUTION INTRAVENOUS at 21:17

## 2023-01-01 RX ADMIN — ATENOLOL 25 MG: 25 TABLET ORAL at 08:16

## 2023-01-01 RX ADMIN — MORPHINE SULFATE 4 MG: 4 INJECTION, SOLUTION INTRAMUSCULAR; INTRAVENOUS at 07:05

## 2023-01-01 RX ADMIN — SODIUM CHLORIDE 1000 ML: 9 INJECTION, SOLUTION INTRAVENOUS at 16:20

## 2023-01-01 RX ADMIN — ASPIRIN 81 MG: 81 TABLET, COATED ORAL at 08:16

## 2023-01-01 RX ADMIN — NITROGLYCERIN 0.4 MG: 0.4 TABLET SUBLINGUAL at 07:04

## 2023-01-01 RX ADMIN — LISINOPRIL 20 MG: 20 TABLET ORAL at 08:16

## 2023-01-01 ASSESSMENT — ACTIVITIES OF DAILY LIVING (ADL)
ADLS_ACUITY_SCORE: 37
ADLS_ACUITY_SCORE: 35
ADLS_ACUITY_SCORE: 35
ADLS_ACUITY_SCORE: 38
ADLS_ACUITY_SCORE: 37
ADLS_ACUITY_SCORE: 37
ADLS_ACUITY_SCORE: 35
ADLS_ACUITY_SCORE: 37
ADLS_ACUITY_SCORE: 37
ADLS_ACUITY_SCORE: 35
ADLS_ACUITY_SCORE: 35
ADLS_ACUITY_SCORE: 37
ADLS_ACUITY_SCORE: 38
ADLS_ACUITY_SCORE: 37
ADLS_ACUITY_SCORE: 37
ADLS_ACUITY_SCORE: 35
ADLS_ACUITY_SCORE: 37
ADLS_ACUITY_SCORE: 37
ADLS_ACUITY_SCORE: 33
ADLS_ACUITY_SCORE: 37
ADLS_ACUITY_SCORE: 38

## 2023-01-11 PROBLEM — R06.82 TACHYPNEA: Status: ACTIVE | Noted: 2023-01-01

## 2023-01-11 PROBLEM — J18.9 COMMUNITY ACQUIRED PNEUMONIA, UNSPECIFIED LATERALITY: Status: ACTIVE | Noted: 2023-01-01

## 2023-01-11 NOTE — ED PROVIDER NOTES
History   Chief Complaint:  Cough and Shortness of Breath     The history is provided by the patient.      Avery Arteaga is a 77 year old female who presents with a cough and shortness of breath. The patient reports that she traveled to [place] over [place], returning home on 12/28/22 with some congestion and rhinorrhea. Since then, she has developed a cough and shortness of breath and has been feeling fatigued with no improvement, despite using an inhaler for the last week. Today, she was seen by her primary care provider for her symptoms and was sent to the ED for further evaluation after having lab work and a chest x-ray. Presently, she endorses that her shortness of breath worsens with exertion or speaking but improves when laying in her bed. She denies any fever, chest pain, or new leg swelling and is not usually on at home oxygen despite her typical oxygen saturation being around 92-93. She does have a history of COPD but denies any previous diagnosis of heart failure.    Review of External Notes: Yes     Allergies:  The patient has no known allergies.    Medications:    Albuterol inhaler  Aspirin 81 mg  Tenormin  Trelegy Ellipta inhaler  Gabapentin  Arava  Lisinopril  Methotrexate  Anaprox  Prednisone     Past Medical History:    COPD  Hypertension  RA  Malignant neoplasm of cecum  RLS  Postprocedural pneumothorax  Hypocalcemia  Osteopenia  Tobacco abuse  Lung nodule   Colon cancer  RSV  Emphysema lung    Past Surgical History:    Right middle finger RA nodule removed  Bronchoscopy x2  Colonoscopy x2  Laparoscopic assisted colectomy   CT guided left upper lobe lung biopsy w/ chest tube placement     Family History:    Alcoholism  Lung cancer  Breast cancer    Social History:  The patient quit smoking about 23 years ago.  The patient presents to the ED alone.  The patient arrived to the ED in a private vehicle.  PCP: Althea Rai     Physical Exam     Patient Vitals for the past 24 hrs:   BP Temp Temp  src Pulse Resp SpO2 Weight   01/11/23 1854 -- -- -- -- 22 -- --   01/11/23 1851 -- -- -- -- -- 98 % --   01/11/23 1850 -- -- -- -- -- 98 % --   01/11/23 1849 -- -- -- -- -- 97 % --   01/11/23 1848 (!) 155/91 -- -- 75 -- -- --   01/11/23 1553 (!) 168/103 -- -- -- -- -- --   01/11/23 1549 -- 97.9  F (36.6  C) Oral 90 (!) 36 91 % 81.6 kg (180 lb)      Physical Exam  Vitals: reviewed by me  General: Pt seen on [facility], cooperative, and alert to conversation  Eyes: Tracking well, clear conjunctiva BL  ENT: MMM, midline trachea.   Lungs: Tachypneic with scattered crackles noted, no wheezing, mild respiratory distress only.  Speaking in full sentences.  CV: Rate as above  Abd: Soft, non tender, no guarding, no rebound. Non distended  MSK: no joint effusion.  No evidence of trauma  Skin: No rash  Neuro: Clear speech and no facial droop.  Psych: Not RIS, no e/o AH/VH    Emergency Department Course   ECG  ECG taken at 1708, ECG read at 1710  Sinus rhythm with premature atrial complexes  Voltage criteria for left ventricular hypertrophy  Abnormal ECG   Rate 79 bpm. WA interval 136 ms. QRS duration 70 ms. QT/QTc 354/405 ms. P-R-T axes -6 0 -7.     Imaging:  CT Chest Pulmonary Embolism w Contrast   Final Result   IMPRESSION:   1.  No pulmonary embolism.      2.  Interval decrease in size of multiple pulmonary nodules.      3.  New scattered groundglass opacities and small areas of consolidation, likely infectious or inflammatory.      4.  Unchanged indeterminant soft tissue nodule in the right pararenal space.         Report per radiology    Laboratory:  Labs Ordered and Resulted from Time of ED Arrival to Time of ED Departure   BASIC METABOLIC PANEL - Abnormal       Result Value    Sodium 137      Potassium 5.1      Chloride 101      Carbon Dioxide (CO2) 22      Anion Gap 14      Urea Nitrogen 23.0      Creatinine 1.18 (*)     Calcium 9.3      Glucose 168 (*)     GFR Estimate 47 (*)    BLOOD GAS VENOUS WITH  OXYHEMOGLOBIN - Abnormal    pH Venous 7.34      pCO2 Venous 48      pO2 Venous 29      Bicarbonate Venous 26      FIO2 0      Oxyhemoglobin Venous 47 (*)     Base Excess/Deficit (+/-) -0.5     CBC WITH PLATELETS AND DIFFERENTIAL - Abnormal    WBC Count 13.7 (*)     RBC Count 3.89      Hemoglobin 12.6      Hematocrit 40.7       (*)     MCH 32.4      MCHC 31.0 (*)     RDW 17.3 (*)     Platelet Count 217      % Neutrophils 95      % Lymphocytes 2      % Monocytes 1      % Eosinophils 1      % Basophils 0      % Immature Granulocytes 1      NRBCs per 100 WBC 0      Absolute Neutrophils 13.1 (*)     Absolute Lymphocytes 0.2 (*)     Absolute Monocytes 0.2      Absolute Eosinophils 0.2      Absolute Basophils 0.1      Absolute Immature Granulocytes 0.1      Absolute NRBCs 0.0     INFLUENZA A/B & SARS-COV2 PCR MULTIPLEX - Normal    Influenza A PCR Negative      Influenza B PCR Negative      RSV PCR Negative      SARS CoV2 PCR Negative     TROPONIN T, HIGH SENSITIVITY - Normal    Troponin T, High Sensitivity 14     NT PROBNP INPATIENT - Normal    N terminal Pro BNP Inpatient 1,387     HEPATIC FUNCTION PANEL - Normal    Protein Total 7.2      Albumin 3.5      Bilirubin Total 0.7      Alkaline Phosphatase 89      AST 29      ALT 21      Bilirubin Direct 0.27     BLOOD CULTURE   BLOOD CULTURE      Emergency Department Course & Assessments:     Interventions:  Medications   cefTRIAXone (ROCEPHIN) 2 g vial to attach to  ml bag for ADULTS or NS 50 ml bag for PEDS (2 g Intravenous New Bag 1/11/23 1923)   azithromycin 500 mg (ZITHROMAX) in 0.9% NaCl 250 mL intermittent infusion 500 mg (has no administration in time range)   iopamidol (ISOVUE-370) solution 120 mL (63 mLs Intravenous Given 1/11/23 1749)   Saline CT scan flush (82 mLs Intravenous Given 1/11/23 1750)      Independent Interpretation (X-rays, CTs, rhythm strip):  I reviewed imaging.    Consultations/Discussion of Management or Tests:  Past medical records,  nursing notes, and vitals reviewed.  1640: I performed an exam of the patient and obtained history, as documented above.   1838: I rechecked the patient and explained findings.  1852: I consulted with Dr. Martínez, hospitalist, regarding the patient's history and presentation here in the emergency department who accepted the patient for admission.  1901: I re consulted with Dr. Martínez here in the ED.    Social Determinants of Health affecting care:  N/A    Disposition:  The patient was admitted to the hospital under the care of Dr. Martínez.     Impression & Plan      Medical Decision Making:  This is a pleasant 77-year-old female who presents to the emergency room with appears to be respiratory distress, likely from a pneumonia.  No evidence of a PE was found, heart failure was of course considered and she does have a slightly elevated BNP, though imaging is most consistent with pneumonia.  Interestingly, she does not have a fever, but I do think it is prudent to treat for community-acquired pneumonia and since she is tachypneic and requiring oxygen, she will need to be admitted.  She was somewhat hesitant to be admitted and wanted antibiotics to go home with, but her family was able to convince her to stay and I do think this is the best course forward.  No evidence of ACS or any other emergent cause of her symptoms was found, though emergent causes were considered.  We will plan for careful monitoring, treatment as above, supportive therapy and admission to the next available hospitalist.    Diagnosis:    ICD-10-CM    1. Community acquired pneumonia, unspecified laterality  J18.9       2. Tachypnea  R06.82          Scribe Disclosure:  I, Delmis Stone, am serving as a scribe at 4:40 PM on 1/11/2023 to document services personally performed by Christiano Acevedo MD based on my observations and the provider's statements to me.     1/11/2023   Christiano Acevedo MD Fitzgerald, Michael  Zach Street MD  01/11/23 0256

## 2023-01-12 NOTE — PROGRESS NOTES
A/Ox4 and SBA. Denies pain. VSS on 2L via NC. Lung sounds diminished in bases. Congested, nonproductive cough. Gets dyspneic with exertion. Sputum sample still needed. Plan to continue antibiotics and wean O2 as able.    BP (!) 164/103 (BP Location: Right arm)   Pulse 78   Temp 97.5  F (36.4  C) (Oral)   Resp 25   Wt 81.6 kg (180 lb)   SpO2 95%   BMI 29.95 kg/m

## 2023-01-12 NOTE — PROGRESS NOTES
[facility]    Medicine Progress Note - Hospitalist Service    Date of Admission:  1/11/2023    Assessment & Plan      Brief summary of admission assessment/MDM:     Avery Arteaga is a 77 year old  female with a significant past medical history of hypertension, rheumatoid arthritis who presents with 2 weeks history of shortness of breath and cough.  Her cough has been nonproductive.  No associated fever chills or chest pain.     On presentation to the ED patient was tachypneic with respiratory rate 36, oxygen saturation was 91% on room air.  Temp was 97.9.  Heart rate 90.  Blood pressure 168/103.  Labs remarkable for leukocytosis.  CT of the chest shows no evidence of pulm embolism.  Decreased in size of multiple pulmonary nodules previously seen.  There is new scattered groundglass opacities and small areas of consolidation likely infectious or inflammatory.        Assessment and Plan     Disposition:    Patient is very anxious to leave, has nebulizer at home no meds for it. Used to smoke only recently quit.        #1.  Community-acquired pneumonia: - with groundglass opacities and small areas of consolidation on chest CT.  Nonproductive cough and leukocytosis.  Patient is on steroid for rheumatoid arthritis.  -- Continue supplemental oxygen as needed, ceftriaxone, azithromycin, monitor cultures and vitals.  -- May transition to oral antibiotic soon if she is able to wean off oxygen.     #2.  Multiple pulm nodules-interval decrease in size.  Patient aware of this and follows with his primary care provider     #3.  Unchanged indeterminate soft tissue nodule in the right pararenal space-patient advised to follow-up with primary care provider        Chronic  comorbid medical conditions:       COPD: No wheezing or acute exacerbation.  Not smoking at this time  Will add steroid and nebs     Rheumatoid arthritis-on leflunomide, methotrexate and daily prednisone, will resume     Hypertension  "on lisinopril and atenolol, will resume                      # Admission Status: Will admit patient to hospitalist service as inpatient as patient likely need over two mid night stay  in the hospital.     # Diet:Diet advanced     # Activity:Advance activity as tolerated     # Education/Counseling :Discussed treatment plan with the patient     # Consults: none      # VTE prophylactic measures:prophylaxis against venous thromboembolism with PCD         # Code Status:DNR / DNI       Diet: Regular Diet Adult    DVT Prophylaxis: Heparin SQ  [place] Catheter: Not present  Lines: None     Cardiac Monitoring: None  Code Status: No CPR- Do NOT Intubate      Clinically Significant Risk Factors Present on Admission                  # Hypertension: home medication list includes antihypertensive(s)      # Obesity: Estimated body mass index is 30.5 kg/m  as calculated from the following:    Height as of this encounter: 1.651 m (5' 5\").    Weight as of this encounter: 83.1 kg (183 lb 4.8 oz).           Disposition Plan home     Expected Discharge Date: 01/13/2023                  Sadi Palacio MD  Hospitalist Service  [facility]  Securely message with [employer] (more info)  Text page via [employer] Paging/Directory   ______________________________________________________________________    Interval History   Admitted up from ER   O2 down to 1L at rest.     Physical Exam   Vital Signs: Temp: 97.5  F (36.4  C) Temp src: Oral BP: (!) 157/84 Pulse: 92   Resp: 20 SpO2: 93 % O2 Device: None (Room air) Oxygen Delivery: 1 LPM  Weight: 183 lbs 4.8 oz   aaox3  Looks stated age [place] pleasant   Neck no jvd or stidor   Lungs reduced aeration  Few advential souds  Heart is regular no murmurs   Extremities unremarkable            Data     I have personally reviewed the following data over the past 24 hrs:    13.0 (H)  \   11.7   / 219     137 102 19.9 /  147 (H)   4.8 24 0.98 (H) \       ALT: N/A AST: N/A AP: N/A TBILI: N/A   ALB: " N/A TOT PROTEIN: N/A LIPASE: N/A       Trop: N/A BNP: N/A       Procal: N/A CRP: N/A Lactic Acid: 2.1 (H)         Imaging results reviewed over the past 24 hrs:   Recent Results (from the past 24 hour(s))   CT Chest Pulmonary Embolism w Contrast    Narrative    EXAM: CT CHEST PULMONARY EMBOLISM W CONTRAST  LOCATION: [facility]  DATE/TIME: 1/11/2023 6:00 PM    INDICATION: SOB and hypoxia w cancer  COMPARISON: CT chest 3/4/2020  TECHNIQUE: CT chest pulmonary angiogram during arterial phase injection of IV contrast. Multiplanar reformats and MIP reconstructions were performed. Dose reduction techniques were used.   CONTRAST: 63mL Isovue 370    FINDINGS:  ANGIOGRAM CHEST: Pulmonary arteries are normal caliber and negative for pulmonary emboli. Thoracic aorta is not well opacified and is  indeterminate for dissection. No CT evidence of right heart strain.    LUNGS AND PLEURA: Emphysema. Bronchial wall thickening and scattered mucus plugging. Many of the pulmonary nodules have decreased in size, for example in the left upper lobe measuring 8 mm (7/58), previous a 14 mm. A pulmonary nodule in the right lower   lobe measuring 11 mm, previously 14 mm, with new central cavitation. New scattered groundglass opacities in the right lung, and new scattered small areas of consolidation in the bilateral lower lobes, likely infectious or inflammatory.    MEDIASTINUM/AXILLAE: No thoracic aortic aneurysm. No lymphadenopathy.    CORONARY ARTERY CALCIFICATION: Cannot evaluate.    UPPER ABDOMEN: Cholelithiasis. A soft tissue nodule in the right pararenal space measures 9 mm, unchanged.    MUSCULOSKELETAL: Unremarkable      Impression    IMPRESSION:  1.  No pulmonary embolism.    2.  Interval decrease in size of multiple pulmonary nodules.    3.  New scattered groundglass opacities and small areas of consolidation, likely infectious or inflammatory.    4.  Unchanged indeterminant soft tissue nodule in the right  pararenal space.

## 2023-01-12 NOTE — H&P
[facility]    Hospitalist Admission Note    Name: Avery Arteaga    MRN: [id number]  YOB: 1945    Age: 77 year old  Date of admission: 1/11/2023  Primary care provider: Althea Rai  Admitting physician : Roni Martínez M.D. ,M.B.A.       Brief summary of admission assessment/MDM:    Avery Arteaga is a 77 year old  female with a significant past medical history of hypertension, rheumatoid arthritis who presents with 2 weeks history of shortness of breath and cough.  Her cough has been nonproductive.  No associated fever chills or chest pain.    On presentation to the ED patient was tachypneic with respiratory rate 36, oxygen saturation was 91% on room air.  Temp was 97.9.  Heart rate 90.  Blood pressure 168/103.  Labs remarkable for leukocytosis.  CT of the chest shows no evidence of pulm embolism.  Decreased in size of multiple pulmonary nodules previously seen.  There is new scattered groundglass opacities and small areas of consolidation likely infectious or inflammatory.      Assessment and Plan       #1.  Community-acquired pneumonia: - with groundglass opacities and small areas of consolidation on chest CT.  Nonproductive cough and leukocytosis.  Patient is on steroid for rheumatoid arthritis.  -- Continue supplemental oxygen as needed, ceftriaxone, azithromycin, monitor cultures and vitals.  -- May transition to oral antibiotic soon if she is able to wean off oxygen.    #2.  Multiple pulm nodules-interval decrease in size.  Patient aware of this and follows with his primary care provider    #3.  Unchanged indeterminate soft tissue nodule in the right pararenal space-patient advised to follow-up with primary care provider      Chronic  comorbid medical conditions:      COPD: No wheezing or acute exacerbation.  Not smoking at this time    Rheumatoid arthritis-on leflunomide, methotrexate and daily prednisone, will resume     Hypertension on lisinopril  and atenolol, will resume             # Admission Status: Will admit patient to hospitalist service as inpatient as patient likely need over two mid night stay  in the hospital.    # Diet:Diet advanced    # Activity:Advance activity as tolerated    # Education/Counseling :Discussed treatment plan with the patient    # Consults: none     # VTE prophylactic measures:prophylaxis against venous thromboembolism with PCD       # Code Status:DNR / DNI    # Disposition:anticipate discharge to home and Anticipate discharge in 3 days        Disclaimer: This note consists of symbols derived from keyboarding, dictation and/or voice recognition software. As a result, there may be errors in the script that have gone undetected. Please consider this when interpreting information found in this chart.             Chief Complaint:     Shortness of breath  History is obtained from the patient          History of Present Illness:      This patient is a 77 year old  female with a significant past medical history of rheumatoid arthritis, hypertension, COPD who presents with the following condition requiring a hospital admission:    Community-acquired pneumonia    Patient is 77-year-old female who presents with 2 weeks history of shortness of breath.  Her shortness of breath has been progressively getting worse.  She has associated dry cough but denies any fever or chills.  She denies any chest pain.  She has abdominal pain or diarrhea.  She quit smoking several years ago.  Evaluation in the emergency department revealed evidence of pulmonary infection on chest CT.  Patient was given antibiotic and was admitted to our service for further treatment including supplemental oxygen, IV antibiotic and follow-up of cultures.         Past Medical History:     Past Medical History:   Diagnosis Date     COPD (chronic obstructive pulmonary disease) (H)      Hypertension      Rheumatoid arthritis (H)             Past Surgical History:     Past  Surgical History:   Procedure Laterality Date     ------------OTHER------------- Right     Right middle finger RA nodule removed     brochoscopy      x 2     COLONOSCOPY       COLONOSCOPY Left 2021    Procedure: COLONOSCOPY;  Surgeon: Skye Mays MD;  Location:  GI     LAPAROSCOPIC ASSISTED COLECTOMY Right 2020    Procedure: laparoscopic assisted right [place] colectomy;  Surgeon: Skye Mays MD;  Location: [facility] OR             Social History:     Social History     Tobacco Use     Smoking status: Former     Packs/day: 0.25     Years: 55.00     Pack years: 13.75     Types: Cigarettes     Quit date: 1/10/2000     Years since quittin.0     Smokeless tobacco: Never   Substance Use Topics     Alcohol use: Not Currently             Family History:   Reviewed and non contributory        Allergies:   No Known Allergies          Medications:        Prior to Admission medications    Medication Sig Last Dose Taking? Auth Provider Long Term End Date   albuterol (PROAIR HFA/PROVENTIL HFA/VENTOLIN HFA) 108 (90 Base) MCG/ACT inhaler Inhale 2 puffs into the lungs every 4 hours as needed   Unknown, Entered By History Yes    aspirin 81 MG EC tablet Take 81 mg by mouth daily   Unknown, Entered By History     atenolol (TENORMIN) 25 MG tablet Take 25 mg by mouth daily   Unknown, Entered By History Yes    Cholecalciferol (VITAMIN D3) 25 MCG (1000 UT) CAPS Take 2,000 Units by mouth daily   Unknown, Entered By History     fa-pyridoxine-cyancobalamin 2.5-25-2 MG TABS per tablet Take 1 tablet by mouth daily   Unknown, Entered By History     Fluticasone-Umeclidin-Vilanterol (TRELEGY ELLIPTA) 100-62.5-25 MCG/INH oral inhaler Inhale 1 puff into the lungs daily   Unknown, Entered By History     gabapentin (NEURONTIN) 300 MG capsule Take 300 mg by mouth At Bedtime   Reported, Patient     leflunomide (ARAVA) 10 MG tablet Take 10 mg by mouth daily   Unknown, Entered By History Yes    lisinopril (ZESTRIL) 20 MG tablet  Take 20 mg by mouth daily    Unknown, Entered By History Yes    methotrexate 2.5 MG tablet Take 9 tablets (22.5 mg) by mouth every 7 days On Mondays.  Skip Monday April 13 and resume on Monday April 20   Haydee Sneed PA-C Yes    multivitamin  with lutein (OCUVITE WITH LTEIN) CAPS per capsule Take 1 capsule by mouth daily   Unknown, Entered By History     naproxen sodium (ANAPROX) 220 MG tablet Take 220 mg by mouth 2 times daily as needed for moderate pain or headaches   Unknown, Entered By History            Review of Systems:     A Comprehensive greater than 10 system review of systems was carried out.  Pertinent positives and negatives are noted above in HPI.  Otherwise negative for contributory information.           Physical Exam:     Vital signs were reviewed    Temp:  [97.9  F (36.6  C)] 97.9  F (36.6  C)  Pulse:  [75-90] 75  Resp:  [22-36] 22  BP: (155-168)/() 155/91  SpO2:  [91 %-98 %] 98 %        GEN: awake, alert, cooperative, no apparent distress, oriented x 3    NECK:Supple ,no mass or thyromegaly     HEENT:  Normocephalic/atraumatic, no scleral icterus, no nasal discharge, mouth moist.    CV:  Regular rate and rhythm, no murmur or JVD.  S1 + S2 noted, no S3 or S4.    LUNGS:  Clear to auscultation bilaterally without rales/rhonchi/wheezing/retractions.  Symmetric chest rise on inhalation noted.    ABD:  Active bowel sounds, soft, non-tender/non-distended.  No rebound/guarding/rigidity.    EXT:  No edema.  No cyanosis.  No joint synovitis noted.Lower extremity pulses are normal bilaterally and     LGS: No cervical or axillary lymphadenopathy     SKIN:  Dry to touch, warm ,no exanthems noted in the visualized areas.    Neurologic:Grossly intact,non focal . No acute focal neurologic deficit     Psychaitric exam: Mood and affect normal                  Data:       All laboratory and imaging data in the past 24 hours reviewed     Results for orders placed or performed during the hospital  encounter of 01/11/23   CT Chest Pulmonary Embolism w Contrast     Status: None    Narrative    EXAM: CT CHEST PULMONARY EMBOLISM W CONTRAST  LOCATION: [facility]  DATE/TIME: 1/11/2023 6:00 PM    INDICATION: SOB and hypoxia w cancer  COMPARISON: CT chest 3/4/2020  TECHNIQUE: CT chest pulmonary angiogram during arterial phase injection of IV contrast. Multiplanar reformats and MIP reconstructions were performed. Dose reduction techniques were used.   CONTRAST: 63mL Isovue 370    FINDINGS:  ANGIOGRAM CHEST: Pulmonary arteries are normal caliber and negative for pulmonary emboli. Thoracic aorta is not well opacified and is  indeterminate for dissection. No CT evidence of right heart strain.    LUNGS AND PLEURA: Emphysema. Bronchial wall thickening and scattered mucus plugging. Many of the pulmonary nodules have decreased in size, for example in the left upper lobe measuring 8 mm (7/58), previous a 14 mm. A pulmonary nodule in the right lower   lobe measuring 11 mm, previously 14 mm, with new central cavitation. New scattered groundglass opacities in the right lung, and new scattered small areas of consolidation in the bilateral lower lobes, likely infectious or inflammatory.    MEDIASTINUM/AXILLAE: No thoracic aortic aneurysm. No lymphadenopathy.    CORONARY ARTERY CALCIFICATION: Cannot evaluate.    UPPER ABDOMEN: Cholelithiasis. A soft tissue nodule in the right pararenal space measures 9 mm, unchanged.    MUSCULOSKELETAL: Unremarkable      Impression    IMPRESSION:  1.  No pulmonary embolism.    2.  Interval decrease in size of multiple pulmonary nodules.    3.  New scattered groundglass opacities and small areas of consolidation, likely infectious or inflammatory.    4.  Unchanged indeterminant soft tissue nodule in the right pararenal space.   Symptomatic Influenza A/B & SARS-CoV2 (COVID-19) Virus PCR Multiplex Nasopharyngeal     Status: Normal    Specimen: Nasopharyngeal; Swab   Result Value  Ref Range    Influenza A PCR Negative Negative    Influenza B PCR Negative Negative    RSV PCR Negative Negative    SARS CoV2 PCR Negative Negative    Narrative    Testing was performed using the Xpert Xpress CoV2/Flu/RSV Assay on the Cepheid GeneXpert Instrument. This test should be ordered for the detection of SARS-CoV-2 and influenza viruses in individuals who meet clinical and/or epidemiological criteria. Test performance is unknown in asymptomatic patients. This test is for in vitro diagnostic use under the FDA EUA for laboratories certified under CLIA to perform high or moderate complexity testing. This test has not been FDA cleared or approved. A negative result does not rule out the presence of PCR inhibitors in the specimen or target RNA in concentration below the limit of detection for the assay. If only one viral target is positive but coinfection with multiple targets is suspected, the sample should be re-tested with another FDA cleared, approved, or authorized test, if coinfection would change clinical management. This test was validated by the [facility] [place]. These laboratories are certified under the Clinical Laboratory Improvement Amendments of 1988 (CLIA-88) as qualified to perform high complexity laboratory testing.   Basic metabolic panel (BMP)     Status: Abnormal   Result Value Ref Range    Sodium 137 136 - 145 mmol/L    Potassium 5.1 3.4 - 5.3 mmol/L    Chloride 101 98 - 107 mmol/L    Carbon Dioxide (CO2) 22 22 - 29 mmol/L    Anion Gap 14 7 - 15 mmol/L    Urea Nitrogen 23.0 8.0 - 23.0 mg/dL    Creatinine 1.18 (H) 0.51 - 0.95 mg/dL    Calcium 9.3 8.8 - 10.2 mg/dL    Glucose 168 (H) 70 - 99 mg/dL    GFR Estimate 47 (L) >60 mL/min/1.73m2   Troponin T, High Sensitivity (now)     Status: Normal   Result Value Ref Range    Troponin T, High Sensitivity 14 <=14 ng/L   Blood gas venous and oxyhgb     Status: Abnormal   Result Value Ref Range    pH Venous 7.34 7.32 - 7.43    pCO2 Venous  48 40 - 50 mm Hg    pO2 Venous 29 25 - 47 mm Hg    Bicarbonate Venous 26 21 - 28 mmol/L    FIO2 0     Oxyhemoglobin Venous 47 (L) 70 - 75 %    Base Excess/Deficit (+/-) -0.5 -7.7 - 1.9 mmol/L   [place] Draw     Status: None    Narrative    The following orders were created for panel order [place] Draw.  Procedure                               Abnormality         Status                     ---------                               -----------         ------                     Extra Blue Top Tube[[id number]]                              Final result               Extra Red Top Tube[[id number]]                               Final result                 Please view results for these tests on the individual orders.   CBC with platelets and differential     Status: Abnormal   Result Value Ref Range    WBC Count 13.7 (H) 4.0 - 11.0 10e3/uL    RBC Count 3.89 3.80 - 5.20 10e6/uL    Hemoglobin 12.6 11.7 - 15.7 g/dL    Hematocrit 40.7 35.0 - 47.0 %     (H) 78 - 100 fL    MCH 32.4 26.5 - 33.0 pg    MCHC 31.0 (L) 31.5 - 36.5 g/dL    RDW 17.3 (H) 10.0 - 15.0 %    Platelet Count 217 150 - 450 10e3/uL    % Neutrophils 95 %    % Lymphocytes 2 %    % Monocytes 1 %    % Eosinophils 1 %    % Basophils 0 %    % Immature Granulocytes 1 %    NRBCs per 100 WBC 0 <1 /100    Absolute Neutrophils 13.1 (H) 1.6 - 8.3 10e3/uL    Absolute Lymphocytes 0.2 (L) 0.8 - 5.3 10e3/uL    Absolute Monocytes 0.2 0.0 - 1.3 10e3/uL    Absolute Eosinophils 0.2 0.0 - 0.7 10e3/uL    Absolute Basophils 0.1 0.0 - 0.2 10e3/uL    Absolute Immature Granulocytes 0.1 <=0.4 10e3/uL    Absolute NRBCs 0.0 10e3/uL   Extra Blue Top Tube     Status: None   Result Value Ref Range    Hold Specimen JIC    Extra Red Top Tube     Status: None   Result Value Ref Range    Hold Specimen JIC    Nt probnp inpatient (BNP)     Status: Normal   Result Value Ref Range    N terminal Pro BNP Inpatient 1,387 0 - 1,800 pg/mL   Hepatic panel     Status: Normal   Result Value Ref Range     Protein Total 7.2 6.4 - 8.3 g/dL    Albumin 3.5 3.5 - 5.2 g/dL    Bilirubin Total 0.7 <=1.2 mg/dL    Alkaline Phosphatase 89 35 - 104 U/L    AST 29 10 - 35 U/L    ALT 21 10 - 35 U/L    Bilirubin Direct 0.27 0.00 - 0.30 mg/dL   EKG 12-lead, tracing only     Status: None (Preliminary result)   Result Value Ref Range    Systolic Blood Pressure  mmHg    Diastolic Blood Pressure  mmHg    Ventricular Rate 79 BPM    Atrial Rate 79 BPM    CO Interval 136 ms    QRS Duration 70 ms     ms    QTc 405 ms    P Axis -6 degrees    R AXIS 0 degrees    T Axis -7 degrees    Interpretation ECG       Sinus rhythm with Premature atrial complexes  Voltage criteria for left ventricular hypertrophy  Abnormal ECG  When compared with ECG of 26-FEB-2020 10:59,  Questionable change in QRS axis  T wave inversion now evident in Inferior leads     CBC + differential     Status: Abnormal    Narrative    The following orders were created for panel order CBC + differential.  Procedure                               Abnormality         Status                     ---------                               -----------         ------                     CBC with platelets and d...[[id number]]  Abnormal            Final result                 Please view results for these tests on the individual orders.          Recent Results (from the past 48 hour(s))   CT Chest Pulmonary Embolism w Contrast    Narrative    EXAM: CT CHEST PULMONARY EMBOLISM W CONTRAST  LOCATION: [facility]  DATE/TIME: 1/11/2023 6:00 PM    INDICATION: SOB and hypoxia w cancer  COMPARISON: CT chest 3/4/2020  TECHNIQUE: CT chest pulmonary angiogram during arterial phase injection of IV contrast. Multiplanar reformats and MIP reconstructions were performed. Dose reduction techniques were used.   CONTRAST: 63mL Isovue 370    FINDINGS:  ANGIOGRAM CHEST: Pulmonary arteries are normal caliber and negative for pulmonary emboli. Thoracic aorta is not well opacified and is   indeterminate for dissection. No CT evidence of right heart strain.    LUNGS AND PLEURA: Emphysema. Bronchial wall thickening and scattered mucus plugging. Many of the pulmonary nodules have decreased in size, for example in the left upper lobe measuring 8 mm (7/58), previous a 14 mm. A pulmonary nodule in the right lower   lobe measuring 11 mm, previously 14 mm, with new central cavitation. New scattered groundglass opacities in the right lung, and new scattered small areas of consolidation in the bilateral lower lobes, likely infectious or inflammatory.    MEDIASTINUM/AXILLAE: No thoracic aortic aneurysm. No lymphadenopathy.    CORONARY ARTERY CALCIFICATION: Cannot evaluate.    UPPER ABDOMEN: Cholelithiasis. A soft tissue nodule in the right pararenal space measures 9 mm, unchanged.    MUSCULOSKELETAL: Unremarkable      Impression    IMPRESSION:  1.  No pulmonary embolism.    2.  Interval decrease in size of multiple pulmonary nodules.    3.  New scattered groundglass opacities and small areas of consolidation, likely infectious or inflammatory.    4.  Unchanged indeterminant soft tissue nodule in the right pararenal space.       EKG results: Sinus rhythm with premature atrial complexes      All imaging studies reviewed by me.       Patient`s old medical records reviewed and case discussed with the ED physician.    ED course-Reviewed  and care plan discussed with Christiano Acevedo MD

## 2023-01-12 NOTE — PLAN OF CARE
[phone]    Patient is A&O x4. SBA to bathroom. Denies pain. VSS On 2L of O2 NC. Lung sounds diminished. Has nonproductive cough. Pt unable to provide sputum sample. Plan for IV abx. Daily weights. Currently resting in bed, able to make needs known.

## 2023-01-12 NOTE — PHARMACY-ADMISSION MEDICATION HISTORY
Admission medication history interview status for this patient is complete. See [facility] admission navigator for allergy information, prior to admission medications and immunization status.     Medication history interview done, indicate source(s): Patient  Medication history resources (including written lists, pill bottles, clinic record):[employer]  Pharmacy: [facility] on [address]    Changes made to PTA medication list:  Added: Prednisone  Changed: None  Reported as Not Taking: None  Removed: Gabapentin, Naproxen,     Actions taken by pharmacist (provider contacted, etc):None     Additional medication history information:None    Medication reconciliation/reorder completed by provider prior to medication history?  N   (Y/N)       Prior to Admission medications    Medication Sig Last Dose Taking? Auth Provider Long Term End Date   albuterol (PROAIR HFA/PROVENTIL HFA/VENTOLIN HFA) 108 (90 Base) MCG/ACT inhaler Inhale 2 puffs into the lungs every 4 hours as needed 1/11/2023 at 1200 Yes Unknown, Entered By History Yes    aspirin 81 MG EC tablet Take 81 mg by mouth daily 1/11/2023 at am Yes Unknown, Entered By History     atenolol (TENORMIN) 25 MG tablet Take 25 mg by mouth daily 1/11/2023 at am Yes Unknown, Entered By History Yes    Cholecalciferol (VITAMIN D3) 25 MCG (1000 UT) CAPS Take 2,000 Units by mouth daily 1/11/2023 Yes Unknown, Entered By History     fa-pyridoxine-cyancobalamin 2.5-25-2 MG TABS per tablet Take 1 tablet by mouth daily 1/11/2023 Yes Unknown, Entered By History     Fluticasone-Umeclidin-Vilanterol (TRELEGY ELLIPTA) 100-62.5-25 MCG/INH oral inhaler Inhale 1 puff into the lungs daily 1/11/2023 Yes Unknown, Entered By History     leflunomide (ARAVA) 10 MG tablet Take 10 mg by mouth daily 1/11/2023 Yes Unknown, Entered By History Yes    lisinopril (ZESTRIL) 20 MG tablet Take 20 mg by mouth daily  1/11/2023 Yes Unknown, Entered By History Yes    multivitamin  with lutein (OCUVITE WITH LTEIN) CAPS  per capsule Take 1 capsule by mouth daily 1/11/2023 Yes Unknown, Entered By History     predniSONE (DELTASONE) 5 MG tablet Take 5 mg by mouth daily 1/11/2023 at am Yes Unknown, Entered By History     methotrexate 2.5 MG tablet Take 9 tablets (22.5 mg) by mouth every 7 days On Mondays.  Skip Monday April 13 and resume on Monday April 20 1/9/2023  Haydee Sneed PA-C Yes

## 2023-01-13 NOTE — PLAN OF CARE
All discharge instructions and meds given to patient, verbalizes understanding. Discharged in stable condition, transporting self home in private vehicle. Pt's children notified.

## 2023-01-13 NOTE — DISCHARGE SUMMARY
[facility]  Discharge Summary  Name: Avery Arteaga    MRN: [id number]  YOB: 1945    Age: 77 year old  Date of Discharge:  1/13/2023 11:49 AM  Date of Admission: 1/11/2023  Primary Care Provider: Althea Rai  Discharge Physician:  Deon Neri MD  Discharging Service:  Hospitalist      Discharge Diagnoses:  Community acquired pneumonia  Acute hypoxemic respiratory failure  COPD with acute exacerbation   Pulmonary nodules  Rheumatoid arthritis  HTN  CKD stage II     Hospital Course:  Avery Arteaga is a 77-year-old female with history of rheumatoid arthritis, HTN, CKD stage II by labs, COPD/emphysema, former smoker, and pulmonary nodules who was admitted on 1/11/2023 for about 2 weeks of cough and progressive shortness of breath.  She had significant sinus congestion 2 weeks ago and then developed a nonproductive cough.  She started to feel more short of breath and used her albuterol inhaler for the first time in years which only provided temporary relief when she used it.  She reluctantly came to the hospital for evaluation.  Initial labs showed mild creatinine elevation at 1.1 fairly similar to baseline levels.  She did have a leukocytosis in the 13 range.  NT proBNP was not elevated.  CT chest did not show PE, but did show significant emphysema, scattered groundglass opacities and small areas of consolidation concerning for community-acquired pneumonia and decreasing size of multiple previously seen pulmonary nodules.  While here she did require some supplemental oxygen via nasal cannula due to hypoxia into the 80s.  She was started on empiric IV ceftriaxone and azithromycin.  After 2 days of therapy she reports feeling much better.  She still has a mild cough, but it is not productive.  She was able to walk in the halls on day of discharge and O2 sats briefly dipped to 86% and quickly recovered without supplemental oxygen.  On day of discharge she did not have any wheezing on exam,  but her overall airflow was poor which I believed to be due to exacerbation of her COPD.  She did say that when she receives albuterol she does provide temporary relief.  I recommended she complete a 5-day course of 40 mg oral prednisone and then resume her 5 mg prednisone daily dosage.  She will finish 3 additional days of oral cefdinir and azithromycin for 5 days total of antibiotics.  She will follow-up with her primary care doctor within 1 week if symptoms fail to improve.     Discharge Disposition:  Discharged to home     Allergies:  No Known Allergies     Discharge Medications:   Current Discharge Medication List      START taking these medications    Details   azithromycin (ZITHROMAX) 250 MG tablet Take 1 tablet (250 mg) by mouth daily for 3 days  Qty: 3 tablet, Refills: 0    Associated Diagnoses: Community acquired pneumonia, unspecified laterality      cefdinir (OMNICEF) 300 MG capsule Take 1 capsule (300 mg) by mouth 2 times daily for 3 days  Qty: 6 capsule, Refills: 0    Associated Diagnoses: Community acquired pneumonia, unspecified laterality         CONTINUE these medications which have CHANGED    Details   !! predniSONE (DELTASONE) 20 MG tablet Take 2 tablets (40 mg) by mouth daily for 5 days  Qty: 10 tablet, Refills: 0    Associated Diagnoses: COPD with acute exacerbation (H)      !! predniSONE (DELTASONE) 5 MG tablet Take 1 tablet (5 mg) by mouth daily Resume after finishing the 5 days of 40mg prednisone daily    Associated Diagnoses: Rheumatoid arthritis, involving unspecified site, unspecified whether rheumatoid factor present (H)       !! - Potential duplicate medications found. Please discuss with provider.      CONTINUE these medications which have NOT CHANGED    Details   albuterol (PROAIR HFA/PROVENTIL HFA/VENTOLIN HFA) 108 (90 Base) MCG/ACT inhaler Inhale 2 puffs into the lungs every 4 hours as needed    Comments: Pharmacy may dispense brand covered by insurance (Proair, or proventil or  "ventolin or generic albuterol inhaler)      aspirin 81 MG EC tablet Take 81 mg by mouth daily      atenolol (TENORMIN) 25 MG tablet Take 25 mg by mouth daily      Cholecalciferol (VITAMIN D3) 25 MCG (1000 UT) CAPS Take 2,000 Units by mouth daily      fa-pyridoxine-cyancobalamin 2.5-25-2 MG TABS per tablet Take 1 tablet by mouth daily      Fluticasone-Umeclidin-Vilanterol (TRELEGY ELLIPTA) 100-62.5-25 MCG/INH oral inhaler Inhale 1 puff into the lungs daily      leflunomide (ARAVA) 10 MG tablet Take 10 mg by mouth daily      lisinopril (ZESTRIL) 20 MG tablet Take 20 mg by mouth daily       multivitamin  with lutein (OCUVITE WITH LTEIN) CAPS per capsule Take 1 capsule by mouth daily      methotrexate 2.5 MG tablet Take 9 tablets (22.5 mg) by mouth every 7 days On Mondays.  Skip Monday April 13 and resume on Monday April 20  Qty:      Associated Diagnoses: Cancer of right colon (H)              Condition on Discharge:  Discharge condition: Stable   Discharge vitals: Blood pressure (!) 157/92, pulse 82, temperature 97.7  F (36.5  C), temperature source Oral, resp. rate 18, height 1.651 m (5' 5\"), weight 83.1 kg (183 lb 4.8 oz), SpO2 93 %.   Code status on discharge: DNR / DNI     History of Illness:  See detailed admission note for full details.    Physical Exam:  Blood pressure (!) 157/92, pulse 82, temperature 97.7  F (36.5  C), temperature source Oral, resp. rate 18, height 1.651 m (5' 5\"), weight 83.1 kg (183 lb 4.8 oz), SpO2 93 %.  Wt Readings from Last 1 Encounters:   01/12/23 83.1 kg (183 lb 4.8 oz)     Constitutional: Awake, NAD   Eyes: sclera white   HEENT:  MMM  Respiratory: No focal crackles or wheeze.  Overall airflow seems decreased with prolonged expiratory phase  Cardiovascular: Few irregular beats, regular rate, no murmur appreciated  GI: non-tender, not distended, bowel sounds present  Skin: Telangiectasias on face  Musculoskeletal/extremities: No significant lower extremity edema  Neurologic: A&O, " speech clear, stable gait  Psychiatric: calm, cooperative, normal affect    Procedures other than Imaging:  none     Imaging:  Results for orders placed or performed during the hospital encounter of 01/11/23   CT Chest Pulmonary Embolism w Contrast    Narrative    EXAM: CT CHEST PULMONARY EMBOLISM W CONTRAST  LOCATION: [facility]  DATE/TIME: 1/11/2023 6:00 PM    INDICATION: SOB and hypoxia w cancer  COMPARISON: CT chest 3/4/2020  TECHNIQUE: CT chest pulmonary angiogram during arterial phase injection of IV contrast. Multiplanar reformats and MIP reconstructions were performed. Dose reduction techniques were used.   CONTRAST: 63mL Isovue 370    FINDINGS:  ANGIOGRAM CHEST: Pulmonary arteries are normal caliber and negative for pulmonary emboli. Thoracic aorta is not well opacified and is  indeterminate for dissection. No CT evidence of right heart strain.    LUNGS AND PLEURA: Emphysema. Bronchial wall thickening and scattered mucus plugging. Many of the pulmonary nodules have decreased in size, for example in the left upper lobe measuring 8 mm (7/58), previous a 14 mm. A pulmonary nodule in the right lower   lobe measuring 11 mm, previously 14 mm, with new central cavitation. New scattered groundglass opacities in the right lung, and new scattered small areas of consolidation in the bilateral lower lobes, likely infectious or inflammatory.    MEDIASTINUM/AXILLAE: No thoracic aortic aneurysm. No lymphadenopathy.    CORONARY ARTERY CALCIFICATION: Cannot evaluate.    UPPER ABDOMEN: Cholelithiasis. A soft tissue nodule in the right pararenal space measures 9 mm, unchanged.    MUSCULOSKELETAL: Unremarkable      Impression    IMPRESSION:  1.  No pulmonary embolism.    2.  Interval decrease in size of multiple pulmonary nodules.    3.  New scattered groundglass opacities and small areas of consolidation, likely infectious or inflammatory.    4.  Unchanged indeterminant soft tissue nodule in the right  pararenal space.        Consultations:  No consultations were requested during this admission.       Recent Lab Results:  Recent Labs   Lab 01/11/23  1602   PHV 7.34   PO2V 29   PCO2V 48   HCO3V 26     Recent Labs   Lab 01/12/23  0910 01/11/23  1602   WBC 13.0* 13.7*   HGB 11.7 12.6   HCT 37.6 40.7   * 105*    217     Recent Labs   Lab 01/13/23  1046 01/12/23  0910 01/11/23  1602   NA  --  137 137   POTASSIUM 4.9 4.8 5.1   CHLORIDE  --  102 101   CO2  --  24 22   ANIONGAP  --  11 14   GLC  --  147* 168*   BUN  --  19.9 23.0   CR  --  0.98* 1.18*   GFRESTIMATED  --  59* 47*   NARESH  --  9.0 9.3     Recent Labs   Lab 01/11/23  1602   NTBNPI 1,387     Recent Labs   Lab 01/12/23  1808 01/12/23  1512   LACT 1.8 2.1*          Pending Results:    Unresulted Labs Ordered in the Past 30 Days of this Admission     Date and Time Order Name Status Description    1/12/2023  3:33 PM Respiratory Aerobic Bacterial Culture with Gram Stain Preliminary     1/11/2023  6:46 PM Blood Culture Arm, Left Preliminary     1/11/2023  6:38 PM Blood Culture Peripheral Blood Preliminary          These results will be followed up by patient's primary care provider.    Discharge Instructions and Follow-Up:   Discharge Procedure Orders   Reason for your hospital stay   Order Comments: You were hospitalized for cough and shortness of breath.  Your CT scan showed emphysema and some infiltrates which could represent bacterial pneumonia so you received IV antibiotics for this.  I do recommend you finish 3 additional days of oral antibiotics on discharge, take first dose of each antibiotic this evening.  I also think you will benefit from a short course of additional prednisone at 40 mg daily for 5 days for a COPD exacerbation.  Take it easy at home and follow-up with your primary care doctor if symptoms are not improving.  For the next 2 to 3 days I do recommend you use your albuterol inhaler every 4 hours while awake until your shortness of  breath is improved.     Follow-up and recommended labs and tests    Order Comments: Follow up with primary care provider, Althea Rai, within 7 days if symptoms fail to improve     Activity   Order Comments: Your activity upon discharge: activity as tolerated     Order Specific Question Answer Comments   Is discharge order? Yes      No CPR- Do NOT Intubate     Order Specific Question Answer Comments   Code status determined by: Discussion with patient/ legal decision maker      Diet   Order Comments: Follow this diet upon discharge: Orders Placed This Encounter      Regular Diet Adult     Order Specific Question Answer Comments   Is discharge order? Yes          I, Deon Neri MD, personally saw the patient today and spent less than or equal to 30 minutes discharging this patient.    Deon Neri MD

## 2023-01-13 NOTE — PLAN OF CARE
End of Shift Summary  For vital signs and complete assessments, please see documentation flowsheets.     Pertinent assessments: A&Ox4. VSS on room air, afebrile. Freq congested cough, denies SOB. Denies pain. LS diminished. Up SBA    Major Shift Events: Uneventful  Treatment Plan: Rocephin, Zithromax, Prednisone  Bedside Nurse: Uneventful

## 2023-01-13 NOTE — PLAN OF CARE
Goal Outcome Evaluation:  End of Shift Summary  For vital signs and complete assessments, please see documentation flowsheets.     Pertinent assessments: VSS, afebrile & sats maintained when weaned to RA. Denies pain. LS diminished/coarse, congested cough. Good appetite, voiding adequately. Walked in hernandez x1, tolerated well with some BROOKS.    Major Shift Events: Uneventful  Treatment Plan: Rocephin, Zithromax, Prednisone  Bedside Nurse: Tiffany Milton RN

## 2023-01-15 NOTE — PROGRESS NOTES
"Clinic Care Coordination Contact  [facility]: Post-Discharge Note  SITUATION                                                      Admission:    Admission Date: 01/11/23   Reason for Admission: cough and progressive shortness of breath.  Discharge:   Discharge Date: 01/13/23  Discharge Diagnosis: Community acquired pneumonia  Acute hypoxemic respiratory failure  COPD with acute exacerbation   Pulmonary nodules  Rheumatoid arthritis  HTN  CKD stage II    BACKGROUND                                                      Per hospital discharge summary and inpatient provider notes:    Avery Arteaga is a 77-year-old female with history of rheumatoid arthritis, HTN, CKD stage II by labs, COPD/emphysema, former smoker, and pulmonary nodules who was admitted on 1/11/2023 for about 2 weeks of cough and progressive shortness of breath.      ASSESSMENT           Discharge Assessment  How are you doing now that you are home?: \"better\"  How are your symptoms? (Red Flag symptoms escalate to triage hotline per guidelines): Improved  Do you feel your condition is stable enough to be safe at home until your provider visit?: Yes  Does the patient have their discharge instructions? : Yes  Does the patient have questions regarding their discharge instructions? : No  Were you started on any new medications or were there changes to any of your previous medications? : Yes  Does the patient have all of their medications?: Yes  Do you have questions regarding any of your medications? : No  Discharge follow-up appointment scheduled within 14 calendar days? : Yes  Discharge Follow Up Appointment Date: 01/19/23  Discharge Follow Up Appointment Scheduled with?: Primary Care Provider         Post-op (Clinicians Only)  Did the patient have surgery or a procedure: No    Patient having frequent diarrhea since yesterday, she thinks likely related to antibiotic use. Isn't concerned, just \"annoying\". Patient sounds mildly short of breath with " conversation but states she was moving around making something to eat and this resolves at rest. Resting today and using albuterol inhaler every 4 hours as directed. 24/7 nurse triage phone number provided for patient ([phone]).    PLAN                                                      Outpatient Plan:  Follow up with primary care provider, Althea Rai, within 7 days if symptoms fail to improve    No future appointments.      For any urgent concerns, please contact our 24 hour nurse triage line: [phone] ([phone])         Lucero Jordan RN

## 2023-04-20 NOTE — ED PROVIDER NOTES
History     Chief Complaint:  Shortness of Breath       HPI   Avery Arteaga is a 78 year old female with a history of hypertension and colon cancer who presents with shortness of breath. EMS reports the patient had worsening shortness of breath for the past couple of days with associated right sided chest pain. On arrival the patient's oxygen sats on room air were 70%. They placed the patient on BiPAP and were able to increase sats to 100%. EMS gave the patient aspirin en route as well. Patient is unable to say exactly how many days she has had chest pain and shortness of breath.  The duration sounds like it has been at least 2 days.  Patient has been using her albuterol without any relief of her shortness of breath.  She does live alone independently.  Daughter is here with her.  She thought initially the pain was likely due to her rheumatoid arthritis and did not seek extra help for this.    Independent Historian: EMS     Review of External Notes: none    ROS:  Review of Systems   ROS: ROS neg other than the symptoms noted above in the HPI.    Allergies:  No Known Allergies     Medications:    Albuterol    Aspirin   Atenolol   Ellipta   Leflunomide   Lisinopril   Methotrexate   Deltasone     Past Medical History:    Colon cancer   COPD   Hypertension   Osteoporosis   RLS   Rheumatoid arthritis     Past Surgical History:    Colectomy   Right middle finger nodule removed   Bronchoscopy     Social History:  The patient presents via EMS.   PCP: Althea Rai     Physical Exam     Patient Vitals for the past 24 hrs:   BP Pulse Resp SpO2 Weight   04/20/23 0703 -- -- -- -- 88.1 kg (194 lb 3.6 oz)   04/20/23 0648 -- -- (!) 32 (!) 82 % --   04/20/23 0646 100/82 78 23 -- --        Physical Exam  Constitutional:       Appearance: She is well-developed.   HENT:      Right Ear: External ear normal.      Left Ear: External ear normal.      Mouth/Throat:      Mouth: Mucous membranes are dry.      Pharynx: Oropharynx is clear.  No oropharyngeal exudate or posterior oropharyngeal erythema.   Eyes:      General: No scleral icterus.     Conjunctiva/sclera: Conjunctivae normal.      Pupils: Pupils are equal, round, and reactive to light.   Cardiovascular:      Rate and Rhythm: Normal rate. Rhythm irregular.      Heart sounds: Normal heart sounds. No murmur heard.     No friction rub. No gallop.   Pulmonary:      Effort: Respiratory distress present.      Breath sounds: No stridor. Wheezing present. No rhonchi or rales.      Comments: Exp wheezing R upper lobe. Tachypnea.   Chest:      Chest wall: No tenderness.   Abdominal:      General: Bowel sounds are normal. There is no distension.      Palpations: Abdomen is soft. There is no mass.      Tenderness: There is no abdominal tenderness.   Musculoskeletal:         General: Normal range of motion.      Cervical back: Normal range of motion and neck supple.   Lymphadenopathy:      Cervical: No cervical adenopathy.   Skin:     General: Skin is warm and dry.      Capillary Refill: Capillary refill takes less than 2 seconds.      Findings: No rash.   Neurological:      General: No focal deficit present.      Mental Status: She is alert.      Cranial Nerves: No cranial nerve deficit.           Emergency Department Course   ECG  ECG taken at 0650, ECG read at 0650  Sinus bradycardia with 1st degree AV block with premature atrial complexes   St elevation consider inferior injury or acute infarct  ** ACUTE MI/STEMI **   Consider right ventricular involvement in acute inferior infarct  Rate 64 bpm. MT interval 320 ms. QRS duration 70 ms. QT/QTc 384/396 ms. P-R-T axes 86 47 96.     Imaging:  XR Chest Port 1 View   Final Result   IMPRESSION: Mild diffuse interstitial prominence is unchanged. No   focal infiltrate. No pleural effusion or pneumothorax.      MARIELLE WHARTON MD            SYSTEM ID:  [id number]         Report per radiology    Laboratory:  Labs Ordered and Resulted from Time of ED Arrival to  Time of ED Departure   BASIC METABOLIC PANEL - Abnormal       Result Value    Sodium 136      Potassium 4.3      Chloride 100      Carbon Dioxide (CO2) 20 (*)     Anion Gap 16 (*)     Urea Nitrogen 27.3 (*)     Creatinine 1.52 (*)     Calcium 9.1      Glucose 259 (*)     GFR Estimate 35 (*)    NT PROBNP INPATIENT - Abnormal    N terminal Pro BNP Inpatient 4,046 (*)    CBC WITH PLATELETS AND DIFFERENTIAL - Abnormal    WBC Count 7.2      RBC Count 3.56 (*)     Hemoglobin 11.9      Hematocrit 39.1       (*)     MCH 33.4 (*)     MCHC 30.4 (*)     RDW 17.1 (*)     Platelet Count 221      % Neutrophils 86      % Lymphocytes 6      % Monocytes 2      % Eosinophils 4      % Basophils 1      % Immature Granulocytes 1      NRBCs per 100 WBC 1 (*)     Absolute Neutrophils 6.2      Absolute Lymphocytes 0.4 (*)     Absolute Monocytes 0.2      Absolute Eosinophils 0.3      Absolute Basophils 0.1      Absolute Immature Granulocytes 0.1      Absolute NRBCs 0.1          Procedures     Emergency Department Course & Assessments:    Interventions:  Medications   lidocaine 1 % 0.1-1 mL (has no administration in time range)   lidocaine (LMX4) cream (has no administration in time range)   sodium chloride (PF) 0.9% PF flush 3 mL (has no administration in time range)   sodium chloride (PF) 0.9% PF flush 3 mL (has no administration in time range)   0.9% sodium chloride BOLUS (1,000 mLs Intravenous $New Bag 4/20/23 0704)   heparin (porcine) injection 1000 units/mL (rounds in 500 unit increments) (has no administration in time range)   nitroGLYcerin (NITROSTAT) sublingual tablet 0.4 mg (0.4 mg Sublingual $Given 4/20/23 0704)   morphine (PF) injection 4 mg (4 mg Intravenous $Given 4/20/23 0705)      Assessments:  0643 I obtained history and examined the patient as noted above.    0654 I spoke with the patient's daughter and updated her on current plan of care and findings.     Independent Interpretation (X-rays, CTs, rhythm  strip):  None    Consultations/Discussion of Management or Tests:  0703 I spoke with Cardiology at [facility] regarding patient's presentation, findings, and plan of care.  Patient going to transfer to the [facility].   0715 No beds at [facility]. Transferring to [facility]  0730 Updated daughter. EMS taking patient to [facility]    Social Determinants of Health affecting care:   None    Disposition:  The patient was transferred to [facility] via EMS. Dr. Magana accepted the patient for transfer.     Impression & Plan    CMS Diagnoses: The patient has a STEMI     The patient was evaluated at 0643   Cath lab transfer was delayed due to bed not being available   ASA given by medics or taken today    Medical Decision Making:  Patient presents today by EMS for shortness of breath and chest pain.  It appears that she has had symptoms for at least 2 days now although patient's not 100% clear.  She did receive aspirin by EMS.  Were not able to obtain a reliable pulse ox reading.  We have tried multiple methods.  She was placed on 6 L by facemask.  Her initial EKG by EMS showed questionable ST elevation in aVF.  Our EKG here she clearly showed STEMI in the inferior leads with reciprocal changes in the lateral leads.  We contacted Cath Lab at [facility] but they had no availability.  We referred to [facility].  We then received a call from patient placement that they did not have any beds available so we cannot send the patient over there.  We then transfer the patient out of system to [facility].  I spoke with Dr. Magana the cardiologist at [facility], and he excepted the patient in transfer.  We sent the patient by [facility]ons emergently for angiogram.  She received 1 oral nitro and experienced hypotension.  We initiated morphine IV for pain control and then IV fluids.  She was started on peripheral Levophed due to hypotension.  I discussed the finding with her daughter in the plan for transfer.  She was in agreement.  Patient was transfer emergently in  critical condition.    Critical Care time:  was 30 minutes for this patient excluding procedures.    Diagnosis:    ICD-10-CM    1. ST elevation myocardial infarction (STEMI), unspecified artery (H)  I21.3            Scribe Disclosure:  IJoaquina, am serving as a scribe at 6:47 AM on 4/20/2023 to document services personally performed by Les Saleh MD based on my observations and the provider's statements to me.     4/20/2023   Les Saleh MD Cheng, Wenlan, MD  04/20/23 0388
